# Patient Record
Sex: MALE | URBAN - METROPOLITAN AREA
[De-identification: names, ages, dates, MRNs, and addresses within clinical notes are randomized per-mention and may not be internally consistent; named-entity substitution may affect disease eponyms.]

---

## 2018-05-10 ENCOUNTER — OFFICE VISIT (OUTPATIENT)
Dept: INTERNAL MEDICINE CLINIC | Facility: CLINIC | Age: 33
End: 2018-05-10

## 2018-05-10 VITALS
HEART RATE: 75 BPM | TEMPERATURE: 98.7 F | DIASTOLIC BLOOD PRESSURE: 94 MMHG | BODY MASS INDEX: 25.62 KG/M2 | RESPIRATION RATE: 18 BRPM | HEIGHT: 69 IN | WEIGHT: 173 LBS | SYSTOLIC BLOOD PRESSURE: 140 MMHG

## 2018-05-10 DIAGNOSIS — R03.0 ELEVATED BLOOD PRESSURE READING: ICD-10-CM

## 2018-05-10 DIAGNOSIS — E11.9 TYPE 2 DIABETES MELLITUS WITHOUT COMPLICATION, WITHOUT LONG-TERM CURRENT USE OF INSULIN (HCC): Primary | ICD-10-CM

## 2018-05-10 DIAGNOSIS — Z23 ENCOUNTER FOR IMMUNIZATION: ICD-10-CM

## 2018-05-10 DIAGNOSIS — N52.9 ERECTILE DYSFUNCTION, UNSPECIFIED ERECTILE DYSFUNCTION TYPE: ICD-10-CM

## 2018-05-10 LAB — HBA1C MFR BLD HPLC: 7.6 %

## 2018-05-10 RX ORDER — METFORMIN HYDROCHLORIDE 500 MG/1
1000 TABLET, EXTENDED RELEASE ORAL
Qty: 60 TAB | Refills: 3 | Status: SHIPPED | OUTPATIENT
Start: 2018-05-10 | End: 2018-09-07 | Stop reason: SDUPTHER

## 2018-05-10 NOTE — PROGRESS NOTES
Subjective:      Derrell Speaker is a 29 y.o. male who presents today for   Chief Complaint   Patient presents with    Diabetes   patient in today for establishment  Patient last saw a doctor in [place] about 3 months ago  Patient diagnosed at age 34 with type 2 diabetes  Mother and father both have diabetes  He says he has been taking metformin  mg daily which was prescribed in [facility]  He says his last A1c was 6.3  A1c today is 7.6    Patient has noticed erectile dysfunction for one year    Flu advised in the fall  Pneumovax today  Order microalbumin today  He has not seen podiatry or ophthalmology      There are no active problems to display for this patient. No Known Allergies  Past Medical History:   Diagnosis Date    Diabetes ([address]      Past Surgical History:   Procedure Laterality Date    HX TONSILLECTOMY       Family History   Problem Relation Age of Onset    Diabetes Mother     Diabetes Father      Social History   Substance Use Topics    Smoking status: Never Smoker    Smokeless tobacco: Never Used    Alcohol use No      Software, , no children  Review of Systems    A comprehensive review of systems was negative except for that written in the HPI. Objective:     Visit Vitals    BP (!) 140/94    Pulse 75    Temp 98.7 °F (37.1 °C) (Oral)    Resp 18    Ht 5' 9\" (1.753 m)    Wt 173 lb (78.5 kg)    BMI 25.55 kg/m2     General:  Alert, cooperative, no distress, appears stated age. Head:  Normocephalic, without obvious abnormality, atraumatic. Eyes:  Conjunctivae/corneas clear. PERRL, EOMs intact. Fundi benign. Ears:  Normal TMs and external ear canals both ears. Nose: Nares normal. Septum midline. Mucosa normal. No drainage or sinus tenderness. Throat: Lips, mucosa, and tongue normal. Teeth and gums normal.   Neck: Supple, symmetrical, trachea midline, no adenopathy, thyroid: no enlargement/tenderness/nodules, no carotid bruit and no JVD.    Back: Symmetric, no curvature. ROM normal. No CVA tenderness. Lungs:   Clear to auscultation bilaterally. Chest wall:  No tenderness or deformity. Heart:  Regular rate and rhythm, S1, S2 normal, no murmur, click, rub or gallop. Abdomen:   Soft, non-tender. Bowel sounds normal. No masses,  No organomegaly. Extremities: Extremities normal, atraumatic, no cyanosis or edema. Pulses: 2+ and symmetric all extremities. Skin: Skin color, texture, turgor normal. No rashes or lesions. Lymph nodes: Cervical, supraclavicular, and axillary nodes normal.   Neurologic: CNII-XII intact. Normal strength, sensation and reflexes throughout. Assessment/Plan:       ICD-10-CM ICD-9-CM    1. Type 2 diabetes mellitus without complication, without long-term current use of insulin ([facility]) J79.3 291.95 METABOLIC PANEL, COMPREHENSIVE      LIPID PANEL      CBC WITH AUTOMATED DIFF      MICROALBUMIN, UR, RAND W/ MICROALB/CREAT RATIO      AMB POC HEMOGLOBIN A1C      TESTOSTERONE, FREE & TOTAL      REFERRAL TO OPHTHALMOLOGY      REFERRAL TO PODIATRY    Increase metformin xr from 500 mg to 1000 mg daily. Patient was offered diabetic teaching but does not want to attend   2. Elevated blood pressure reading R03.0 796.2    3. Erectile dysfunction, unspecified erectile dysfunction type N52.9 607.84 TESTOSTERONE, FREE & TOTAL   4. Encounter for immunization Z23 V03.89 PNEUMOCOCCAL POLYSACCHARIDE VACCINE, 23-VALENT, ADULT OR IMMUNOSUPPRESSED PT DOSE,       Follow-up Disposition: Not on File   Advised him to call back or return to office if symptoms worsen/change/persist.  Discussed expected course/resolution/complications of diagnosis in detail with patient. Medication risks/benefits/costs/interactions/alternatives discussed with patient. He was given an after visit summary which includes diagnoses, current medications, & vitals. He expressed understanding with the diagnosis and plan.

## 2018-05-10 NOTE — PROGRESS NOTES
Chief Complaint   Patient presents with    Diabetes     1. Have you been to the ER, urgent care clinic since your last visit? Hospitalized since your last visit? No    2. Have you seen or consulted any other health care providers outside of the [address] [place] [address] since your last visit? Include any pap smears or colon screening. Nena Willoughby  is a 29 y.o.  male  who present for pnuemovax 23 immunizations/injections. He/she denies any symptoms , reactions or allergies that would exclude them from being immunized today. Risks and adverse reactions were discussed and the VIS was given if applicable to them. All questions were addressed. He/She was observed for 5 min post injection. There were no reactions observed.     Coy Cespedes LPN

## 2018-05-10 NOTE — LETTER
5/16/2018 12:13 PM 
 
Mr. Meghan Srinivasan [address] [facility],[address] [facility] [address] Dear Meghan Srinivasan: 
 
Please find your most recent results below. Resulted Orders METABOLIC PANEL, COMPREHENSIVE Result Value Ref Range Glucose 153 (H) 65 - 99 mg/dL BUN 11 6 - 20 mg/dL Creatinine 0.82 0.76 - 1.27 mg/dL GFR est non- >59 mL/min/1.73 GFR est  >59 mL/min/1.73  
 BUN/Creatinine ratio 13 9 - 20 Sodium 141 134 - 144 mmol/L Potassium 4.5 3.5 - 5.2 mmol/L Chloride 101 96 - 106 mmol/L  
 CO2 24 18 - 29 mmol/L Calcium 9.5 8.7 - 10.2 mg/dL Protein, total 7.0 6.0 - 8.5 g/dL Albumin 4.6 3.5 - 5.5 g/dL GLOBULIN, TOTAL 2.4 1.5 - 4.5 g/dL A-G Ratio 1.9 1.2 - 2.2 Bilirubin, total 0.4 0.0 - 1.2 mg/dL Alk. phosphatase 72 39 - 117 IU/L  
 AST (SGOT) 20 0 - 40 IU/L  
 ALT (SGPT) 35 0 - 44 IU/L Narrative Performed at:  [address]  [id number] : Frankie Valero MD, Phone:  [phone] LIPID PANEL Result Value Ref Range Cholesterol, total 133 100 - 199 mg/dL Triglyceride 84 0 - 149 mg/dL HDL Cholesterol 37 (L) >39 mg/dL VLDL, calculated 17 5 - 40 mg/dL LDL, calculated 79 0 - 99 mg/dL Narrative Performed at:  [address]  [id number] : Frankie Valero MD, Phone:  [phone] CBC WITH AUTOMATED DIFF Result Value Ref Range WBC 5.0 3.4 - 10.8 x10E3/uL  
 RBC 5.45 4.14 - 5.80 x10E6/uL HGB 15.4 13.0 - 17.7 g/dL HCT 46.6 37.5 - 51.0 % MCV 86 79 - 97 fL  
 MCH 28.3 26.6 - 33.0 pg  
 MCHC 33.0 31.5 - 35.7 g/dL  
 RDW 13.8 12.3 - 15.4 % PLATELET 132 272 - 908 x10E3/uL NEUTROPHILS 53 Not Estab. % Lymphocytes 39 Not Estab. % MONOCYTES 5 Not Estab. % EOSINOPHILS 2 Not Estab. % BASOPHILS 1 Not Estab. %  
 ABS. NEUTROPHILS 2.7 1.4 - 7.0 x10E3/uL Abs Lymphocytes 1.9 0.7 - 3.1 x10E3/uL  
 ABS. MONOCYTES 0.3 0.1 - 0.9 x10E3/uL  
 ABS. EOSINOPHILS 0.1 0.0 - 0.4 x10E3/uL  
 ABS. BASOPHILS 0.0 0.0 - 0.2 x10E3/uL IMMATURE GRANULOCYTES 0 Not Estab. %  
 ABS. IMM. GRANS. 0.0 0.0 - 0.1 x10E3/uL Narrative Performed at:  [address]  [id number] : Frankie Valero MD, Phone:  [phone] MICROALBUMIN, UR, RAND W/ MICROALB/CREAT RATIO Result Value Ref Range Creatinine, urine 68.7 Not Estab. mg/dL Microalbumin, urine 5.0 Not Estab. ug/mL Microalb/Creat ratio (ug/mg creat.) 7.3 0.0 - 30.0 mg/g creat Narrative Performed at:  [address]  [id number] : Frankie Valero MD, Phone:  [phone] AMB POC HEMOGLOBIN A1C Result Value Ref Range Hemoglobin A1c (POC) 7.6 % TESTOSTERONE, FREE & TOTAL Result Value Ref Range Testosterone 352 264 - 916 ng/dL Comment:  
   Adult male reference interval is based on a population of 
healthy nonobese males (BMI <30) between 23and 44years old. [address] [place] [address], [address] [address],[facility];4236-3191. PMID: [id number]. Free testosterone (Direct) 13.0 8.7 - 25.1 pg/mL Narrative Performed at:  [address]  [id number] : Frankie Valero MD, Phone:  [phone] RECOMMENDATIONS: 
{RECOMMENDATION QDWK:46911: 
:Blood sugar is elevated which is consistent with your diabetes Normal kidney and liver function Normal urine microalbumin Normal blood counts Normal testosterone Overall lipid panel looks fine HDL slightly low- should improve with exercise, also eat foods high in omega 3 fatty acids like salmon, almonds, avocado Please call me if you have any questions: [phone] Sincerely, Jess Morgan MD

## 2018-05-10 NOTE — MR AVS SNAPSHOT
Lisethreysammy Fareed 
 
 
 [facility] 
[phone] Patient: Meghan Srinivasan MRN: [id number] ARMIN:[birth year] Visit Information Date & Time Provider Department Dept. Phone Encounter #  
 5/10/2018  1:00 PM Jess Morgan [facility] Internal Medicine [phone] [id number] Follow-up Instructions Return in about 2 weeks (around 5/24/2018) for 2 week s bp check, 3 months diabetes check. Your Appointments 5/30/2018 12:15 PM  
Office Visit with Jess Morgan MD  
[facility] Internal Medicine [facility] [address] Upcoming Health Maintenance Date Due DTaP/Tdap/Td series (1 - Tdap) 8/14/2008 Influenza Age 5 to Adult 8/1/2018 Allergies as of 5/10/2018  Review Complete On: 5/10/2018 By: Mark Conti LPN No Known Allergies Current Immunizations  Never Reviewed Name Date Pneumococcal Polysaccharide (PPSV-23)  Incomplete Not reviewed this visit You Were Diagnosed With   
  
 Codes Comments Type 2 diabetes mellitus without complication, without long-term current use of insulin (HCC)    -  Primary ICD-10-CM: E11.9 ICD-9-CM: 250.00 Elevated blood pressure reading     ICD-10-CM: R03.0 ICD-9-CM: 796.2 Erectile dysfunction, unspecified erectile dysfunction type     ICD-10-CM: N52.9 ICD-9-CM: 607.84 Encounter for immunization     ICD-10-CM: R27 ICD-9-CM: V03.89 Vitals BP Pulse Temp Resp Height(growth percentile) Weight(growth percentile) (!) 140/94 75 98.7 °F (37.1 °C) (Oral) 18 5' 9\" (1.753 m) 173 lb (78.5 kg) BMI Smoking Status 25.55 kg/m2 Never Smoker Vitals History BMI and BSA Data Body Mass Index Body Surface Area 25.55 kg/m 2 1.95 m 2 Your Updated Medication List  
  
   
 This list is accurate as of 5/10/18  2:04 PM.  Always use your most recent med list.  
  
  
  
  
 metFORMIN  mg tablet Commonly known as:  GLUCOPHAGE XR Take 2 Tabs by mouth daily (with dinner). Prescriptions Printed Refills  
 metFORMIN ER (GLUCOPHAGE XR) 500 mg tablet 3 Sig: Take 2 Tabs by mouth daily (with dinner). Class: Print Route: Oral  
  
We Performed the Following AMB POC HEMOGLOBIN A1C [17112 CPT(R)] CBC WITH AUTOMATED DIFF [89202 CPT(R)] LIPID PANEL [43257 CPT(R)] METABOLIC PANEL, COMPREHENSIVE [51391 CPT(R)] MICROALBUMIN, UR, RAND W/ MICROALB/CREAT RATIO [id number] CPT(R)] PNEUMOCOCCAL POLYSACCHARIDE VACCINE, 23-VALENT, ADULT OR IMMUNOSUPPRESSED PT DOSE, [29190 CPT(R)] REFERRAL TO OPHTHALMOLOGY [REF57 Custom] REFERRAL TO PODIATRY [REF90 Custom] Comments:  
 LILI UgartePOLY [address] Phone: [phone] TESTOSTERONE, FREE & TOTAL [41384 CPT(R)] Follow-up Instructions Return in about 2 weeks (around 5/24/2018) for 2 week s bp check, 3 months diabetes check. Referral Information Referral ID Referred By Referred To  
  
 [id number] LATHAM, Harrel Blizzard, [address] [place] [address] [phone] Phone: [phone] Fax: [phone] Visits Status Start Date End Date 1 New Request 5/10/18 5/10/19 If your referral has a status of pending review or denied, additional information will be sent to support the outcome of this decision. Referral ID Referred By Referred To  
 [id number] Katy Carlson DPM  
   [address] [facility] [address] [facility] [address] [facility] [address] Phone: [phone] Fax: [phone] Visits Status Start Date End Date 1 New Request 5/10/18 5/10/19  If your referral has a status of pending review or denied, additional information will be sent to support the outcome of this decision. Introducing [facility] & HEALTH SERVICES! [employer] introduces [employer] patient portal. Now you can access parts of your medical record, email your doctor's office, and request medication refills online. 1. In your internet browser, go to [email or web address] 2. Click on the First Time User? Click Here link in the Sign In box. You will see the New Member Sign Up page. 3. Enter your [employer] Access Code exactly as it appears below. You will not need to use this code after youve completed the sign-up process. If you do not sign up before the expiration date, you must request a new code. · [employer] Access Code: [email or web address] Expires: 8/8/2018  1:09 PM 
 
4. Enter the last four digits of your Social Security Number (xxxx) and Date of Birth (mm/dd/yyyy) as indicated and click Submit. You will be taken to the next sign-up page. 5. Create a [employer] ID. This will be your [employer] login ID and cannot be changed, so think of one that is secure and easy to remember. 6. Create a [employer] password. You can change your password at any time. 7. Enter your Password Reset Question and Answer. This can be used at a later time if you forget your password. 8. Enter your e-mail address. You will receive e-mail notification when new information is available in [address] 9. Click Sign Up. You can now view and download portions of your medical record. 10. Click the Download Summary menu link to download a portable copy of your medical information. If you have questions, please visit the Frequently Asked Questions section of the [employer] website. Remember, [employer] is NOT to be used for urgent needs. For medical emergencies, dial 911. Now available from your iPhone and Android! Please provide this summary of care documentation to your next provider. If you have any questions after today's visit, please call [phone].

## 2018-05-11 ENCOUNTER — LAB ONLY (OUTPATIENT)
Dept: INTERNAL MEDICINE CLINIC | Facility: CLINIC | Age: 33
End: 2018-05-11

## 2018-05-13 LAB
ALBUMIN SERPL-MCNC: 4.6 G/DL (ref 3.5–5.5)
ALBUMIN/CREAT UR: 7.3 MG/G CREAT (ref 0–30)
ALBUMIN/GLOB SERPL: 1.9 {RATIO} (ref 1.2–2.2)
ALP SERPL-CCNC: 72 IU/L (ref 39–117)
ALT SERPL-CCNC: 35 IU/L (ref 0–44)
AST SERPL-CCNC: 20 IU/L (ref 0–40)
BASOPHILS # BLD AUTO: 0 X10E3/UL (ref 0–0.2)
BASOPHILS NFR BLD AUTO: 1 %
BILIRUB SERPL-MCNC: 0.4 MG/DL (ref 0–1.2)
BUN SERPL-MCNC: 11 MG/DL (ref 6–20)
BUN/CREAT SERPL: 13 (ref 9–20)
CALCIUM SERPL-MCNC: 9.5 MG/DL (ref 8.7–10.2)
CHLORIDE SERPL-SCNC: 101 MMOL/L (ref 96–106)
CHOLEST SERPL-MCNC: 133 MG/DL (ref 100–199)
CO2 SERPL-SCNC: 24 MMOL/L (ref 18–29)
CREAT SERPL-MCNC: 0.82 MG/DL (ref 0.76–1.27)
CREAT UR-MCNC: 68.7 MG/DL
EOSINOPHIL # BLD AUTO: 0.1 X10E3/UL (ref 0–0.4)
EOSINOPHIL NFR BLD AUTO: 2 %
ERYTHROCYTE [DISTWIDTH] IN BLOOD BY AUTOMATED COUNT: 13.8 % (ref 12.3–15.4)
GFR SERPLBLD CREATININE-BSD FMLA CKD-EPI: 119 ML/MIN/1.73
GFR SERPLBLD CREATININE-BSD FMLA CKD-EPI: 137 ML/MIN/1.73
GLOBULIN SER CALC-MCNC: 2.4 G/DL (ref 1.5–4.5)
GLUCOSE SERPL-MCNC: 153 MG/DL (ref 65–99)
HCT VFR BLD AUTO: 46.6 % (ref 37.5–51)
HDLC SERPL-MCNC: 37 MG/DL
HGB BLD-MCNC: 15.4 G/DL (ref 13–17.7)
IMM GRANULOCYTES # BLD: 0 X10E3/UL (ref 0–0.1)
IMM GRANULOCYTES NFR BLD: 0 %
LDLC SERPL CALC-MCNC: 79 MG/DL (ref 0–99)
LYMPHOCYTES # BLD AUTO: 1.9 X10E3/UL (ref 0.7–3.1)
LYMPHOCYTES NFR BLD AUTO: 39 %
MCH RBC QN AUTO: 28.3 PG (ref 26.6–33)
MCHC RBC AUTO-ENTMCNC: 33 G/DL (ref 31.5–35.7)
MCV RBC AUTO: 86 FL (ref 79–97)
MICROALBUMIN UR-MCNC: 5 UG/ML
MONOCYTES # BLD AUTO: 0.3 X10E3/UL (ref 0.1–0.9)
MONOCYTES NFR BLD AUTO: 5 %
NEUTROPHILS # BLD AUTO: 2.7 X10E3/UL (ref 1.4–7)
NEUTROPHILS NFR BLD AUTO: 53 %
PLATELET # BLD AUTO: 166 X10E3/UL (ref 150–379)
POTASSIUM SERPL-SCNC: 4.5 MMOL/L (ref 3.5–5.2)
PROT SERPL-MCNC: 7 G/DL (ref 6–8.5)
RBC # BLD AUTO: 5.45 X10E6/UL (ref 4.14–5.8)
SODIUM SERPL-SCNC: 141 MMOL/L (ref 134–144)
TESTOST FREE SERPL-MCNC: 13 PG/ML (ref 8.7–25.1)
TESTOST SERPL-MCNC: 352 NG/DL (ref 264–916)
TRIGL SERPL-MCNC: 84 MG/DL (ref 0–149)
VLDLC SERPL CALC-MCNC: 17 MG/DL (ref 5–40)
WBC # BLD AUTO: 5 X10E3/UL (ref 3.4–10.8)

## 2018-05-16 NOTE — PROGRESS NOTES
Blood sugar is elevated which is consistent with his diabetes  Normal kidney and liver function  Normal urine microalbumin  Normal blood counts  Normal testosterone  Overall lipid panel looks fine  HDL slightly low- should improve with exercise, also eat foods high in omega 3 fatty acids like salmon, almonds, avocado

## 2018-05-30 ENCOUNTER — OFFICE VISIT (OUTPATIENT)
Dept: INTERNAL MEDICINE CLINIC | Facility: CLINIC | Age: 33
End: 2018-05-30

## 2018-05-30 VITALS
WEIGHT: 173.6 LBS | DIASTOLIC BLOOD PRESSURE: 75 MMHG | HEART RATE: 76 BPM | RESPIRATION RATE: 18 BRPM | TEMPERATURE: 98.3 F | BODY MASS INDEX: 25.71 KG/M2 | SYSTOLIC BLOOD PRESSURE: 114 MMHG | HEIGHT: 69 IN

## 2018-05-30 DIAGNOSIS — E78.6 LOW HDL (UNDER 40): ICD-10-CM

## 2018-05-30 DIAGNOSIS — E11.9 TYPE 2 DIABETES MELLITUS WITHOUT COMPLICATION, WITHOUT LONG-TERM CURRENT USE OF INSULIN (HCC): ICD-10-CM

## 2018-05-30 DIAGNOSIS — R03.0 ELEVATED BP WITHOUT DIAGNOSIS OF HYPERTENSION: Primary | ICD-10-CM

## 2018-05-30 NOTE — MR AVS SNAPSHOT
[address] [facility] 
[phone] Patient: Scot Anguiano MRN: [id number] LEL:[birth year] Visit Information Date & Time Provider Department Dept. Phone Encounter #  
 5/30/2018 12:15 PM Patricia Brandt, [address] [facility] Internal Medicine [phone] [id number] Follow-up Instructions Return in about 2 months (around 8/10/2018) for A1c and diabetic check up. Your Appointments 7/2/2018  2:15 PM  
ROUTINE CARE with Patricia Brandt MD  
[facility] Internal Medicine [address] [facility]) Appt Note: 45 days later diabetic check [address] [facility] Upcoming Health Maintenance Date Due DTaP/Tdap/Td series (1 - Tdap) 8/14/2008 Influenza Age 5 to Adult 8/1/2018 Allergies as of 5/30/2018  Review Complete On: 5/30/2018 By: Danielle Cortez LPN No Known Allergies Current Immunizations  Reviewed on 5/10/2018 Name Date Pneumococcal Polysaccharide (PPSV-23) 5/10/2018 Not reviewed this visit Vitals BP Pulse Temp Resp Height(growth percentile) Weight(growth percentile) 114/75 76 98.3 °F (36.8 °C) (Oral) 18 5' 9\" (1.753 m) 173 lb 9.6 oz (78.7 kg) BMI Smoking Status 25.64 kg/m2 Never Smoker Vitals History BMI and BSA Data Body Mass Index Body Surface Area  
 25.64 kg/m 2 1.96 m 2 Preferred Pharmacy Pharmacy Name Phone [id number] [place] [place] [address] Your Updated Medication List  
  
   
This list is accurate as of 5/30/18 12:55 PM.  Always use your most recent med list.  
  
  
  
  
 metFORMIN  mg tablet Commonly known as:  GLUCOPHAGE XR Take 2 Tabs by mouth daily (with dinner). Follow-up Instructions Return in about 2 months (around 8/10/2018) for A1c and diabetic check up. Introducing [facility] & HEALTH SERVICES! [employer] introduces [employer] patient portal. Now you can access parts of your medical record, email your doctor's office, and request medication refills online. 1. In your internet browser, go to [email or web address] 2. Click on the First Time User? Click Here link in the Sign In box. You will see the New Member Sign Up page. 3. Enter your [employer] Access Code exactly as it appears below. You will not need to use this code after youve completed the sign-up process. If you do not sign up before the expiration date, you must request a new code. · [employer] Access Code: [email or web address] Expires: 8/8/2018  1:09 PM 
 
4. Enter the last four digits of your Social Security Number (xxxx) and Date of Birth (mm/dd/yyyy) as indicated and click Submit. You will be taken to the next sign-up page. 5. Create a [employer] ID. This will be your [employer] login ID and cannot be changed, so think of one that is secure and easy to remember. 6. Create a [employer] password. You can change your password at any time. 7. Enter your Password Reset Question and Answer. This can be used at a later time if you forget your password. 8. Enter your e-mail address. You will receive e-mail notification when new information is available in [address] 9. Click Sign Up. You can now view and download portions of your medical record. 10. Click the Download Summary menu link to download a portable copy of your medical information. If you have questions, please visit the Frequently Asked Questions section of the [employer] website. Remember, [employer] is NOT to be used for urgent needs. For medical emergencies, dial 911. Now available from your iPhone and Android! Please provide this summary of care documentation to your next provider. Your primary care clinician is listed as Deryl Drivers. If you have any questions after today's visit, please call [phone].

## 2018-05-30 NOTE — PROGRESS NOTES
Subjective:      Annabel Martinez is a 29 y.o. male who presents today for   Chief Complaint   Patient presents with    Blood Pressure Check     Elevated blood pressure- BP elevated at las visit and in today for recheck    Type 2 DM- has been taking increased dose of metformin XR. Increased to 2 tabs of metformin  mg daily    Low HDL per recent lipid panel    There are no active problems to display for this patient. Current Outpatient Prescriptions   Medication Sig Dispense Refill    metFORMIN ER (GLUCOPHAGE XR) 500 mg tablet Take 2 Tabs by mouth daily (with dinner). 61 Tab 3     No Known Allergies  Past Medical History:   Diagnosis Date    Diabetes ([address]      Past Surgical History:   Procedure Laterality Date    HX TONSILLECTOMY       Family History   Problem Relation Age of Onset    Diabetes Mother     Diabetes Father      Social History   Substance Use Topics    Smoking status: Never Smoker    Smokeless tobacco: Never Used    Alcohol use No        Review of Systems    A comprehensive review of systems was negative except for that written in the HPI. Objective:     Visit Vitals    /75    Pulse 76    Temp 98.3 °F (36.8 °C) (Oral)    Resp 18    Ht 5' 9\" (1.753 m)    Wt 173 lb 9.6 oz (78.7 kg)    BMI 25.64 kg/m2     General:  Alert, cooperative, no distress, appears stated age. Head:  Normocephalic, without obvious abnormality, atraumatic. Eyes:  Conjunctivae/corneas clear. PERRL, EOMs intact. Fundi benign. Ears:  Normal TMs and external ear canals both ears. Nose: Nares normal. Septum midline. Mucosa normal. No drainage or sinus tenderness. Throat: Lips, mucosa, and tongue normal. Teeth and gums normal.   Neck: Supple, symmetrical, trachea midline, no adenopathy, thyroid: no enlargement/tenderness/nodules, no carotid bruit and no JVD. Back:   Symmetric, no curvature. ROM normal. No CVA tenderness. Lungs:   Clear to auscultation bilaterally.    Chest wall:  No tenderness or deformity. Heart:  Regular rate and rhythm, S1, S2 normal, no murmur, click, rub or gallop. Abdomen:   Soft, non-tender. Bowel sounds normal. No masses,  No organomegaly. Extremities: Extremities normal, atraumatic, no cyanosis or edema. Pulses: 2+ and symmetric all extremities. Skin: Skin color, texture, turgor normal. No rashes or lesions. Lymph nodes: Cervical, supraclavicular, and axillary nodes normal.   Neurologic: CNII-XII intact. Normal strength, sensation and reflexes throughout. Assessment/Plan:     1) elevated BP- BP much better controlled today    2) low HDL- discussed regular exercise and eating foods high in omega 3 fatty acids    3)type 2 DM- continue 2 metformin daily, low carb diet, exercise    Follow-up Disposition: Not on File   Advised him to call back or return to office if symptoms worsen/change/persist.  Discussed expected course/resolution/complications of diagnosis in detail with patient. Medication risks/benefits/costs/interactions/alternatives discussed with patient. He was given an after visit summary which includes diagnoses, current medications, & vitals. He expressed understanding with the diagnosis and plan.  v

## 2018-05-30 NOTE — PROGRESS NOTES
Chief Complaint   Patient presents with    Blood Pressure Check     1. Have you been to the ER, urgent care clinic since your last visit? Hospitalized since your last visit? No    2. Have you seen or consulted any other health care providers outside of the [facility] since your last visit? Include any pap smears or colon screening.  No

## 2018-08-07 ENCOUNTER — TELEPHONE (OUTPATIENT)
Dept: INTERNAL MEDICINE CLINIC | Facility: CLINIC | Age: 33
End: 2018-08-07

## 2018-08-07 ENCOUNTER — OFFICE VISIT (OUTPATIENT)
Dept: INTERNAL MEDICINE CLINIC | Facility: CLINIC | Age: 33
End: 2018-08-07

## 2018-08-07 VITALS
HEART RATE: 71 BPM | TEMPERATURE: 98.5 F | HEIGHT: 69 IN | RESPIRATION RATE: 18 BRPM | SYSTOLIC BLOOD PRESSURE: 129 MMHG | BODY MASS INDEX: 25.33 KG/M2 | WEIGHT: 171 LBS | OXYGEN SATURATION: 96 % | DIASTOLIC BLOOD PRESSURE: 82 MMHG

## 2018-08-07 DIAGNOSIS — R53.83 FATIGUE, UNSPECIFIED TYPE: Primary | ICD-10-CM

## 2018-08-07 DIAGNOSIS — R03.0 ELEVATED BLOOD PRESSURE READING: ICD-10-CM

## 2018-08-07 DIAGNOSIS — R07.9 CHEST PAIN, UNSPECIFIED TYPE: ICD-10-CM

## 2018-08-07 DIAGNOSIS — E78.6 LOW HDL (UNDER 40): ICD-10-CM

## 2018-08-07 LAB — HBA1C MFR BLD HPLC: 9.7 %

## 2018-08-07 NOTE — MR AVS SNAPSHOT
[address] [facility] 
[phone] Patient: Minnie Fulton MRN: [id number] MLA:8/98/2970 Visit Information Date & Time Provider Department Dept. Phone Encounter #  
 8/7/2018 10:15 AM Rubye Harada, MD [facility] Internal Medicine [phone] [id number] Follow-up Instructions Return in about 2 weeks (around 8/21/2018) for follow up review results. Your Appointments 8/10/2018 10:00 AM  
ROUTINE CARE with Rubye Harada, MD  
[facility] Internal Medicine [facility]) Appt Note: A1c and diabetic check up $0CP 05/30/2018 [facility] Upcoming Health Maintenance Date Due  
 FOOT EXAM Q1 8/14/1997 EYE EXAM RETINAL OR DILATED Q1 8/14/1997 DTaP/Tdap/Td series (1 - Tdap) 8/14/2008 Influenza Age 5 to Adult 8/1/2018 HEMOGLOBIN A1C Q6M 11/10/2018 MICROALBUMIN Q1 5/11/2019 LIPID PANEL Q1 5/11/2019 Allergies as of 8/7/2018  Review Complete On: 8/7/2018 By: Pedro Curran LPN No Known Allergies Current Immunizations  Reviewed on 5/10/2018 Name Date Pneumococcal Polysaccharide (PPSV-23) 5/10/2018 Not reviewed this visit You Were Diagnosed With   
  
 Codes Comments Fatigue, unspecified type    -  Primary ICD-10-CM: R53.83 ICD-9-CM: 780.79 Chest pain, unspecified type     ICD-10-CM: R07.9 ICD-9-CM: 786.50 Controlled type 2 diabetes mellitus without complication, without long-term current use of insulin ([facility]     ICD-10-CM: E11.9 ICD-9-CM: 250.00 Low HDL (under 40)     ICD-10-CM: E78.6 ICD-9-CM: 272.5 Elevated blood pressure reading     ICD-10-CM: R03.0 ICD-9-CM: 796.2  Uncontrolled type 2 diabetes mellitus with complication, without long-term current use of insulin ([facility])     ICD-10-CM: E11.8, E11.65 
 ICD-9-CM: 250.82 Vitals BP Pulse Temp Resp Height(growth percentile) Weight(growth percentile) 129/82 (BP 1 Location: Right arm, BP Patient Position: Sitting) 71 98.5 °F (36.9 °C) (Oral) 18 5' 9\" (1.753 m) 171 lb (77.6 kg) SpO2 BMI Smoking Status 96% 25.25 kg/m2 Never Smoker Vitals History BMI and BSA Data Body Mass Index Body Surface Area  
 25.25 kg/m 2 1.94 m 2 Preferred Pharmacy Pharmacy Name Phone [id number] [place], [place] [address] Your Updated Medication List  
  
   
This list is accurate as of 8/7/18 11:33 AM.  Always use your most recent med list.  
  
  
  
  
 metFORMIN  mg tablet Commonly known as:  GLUCOPHAGE XR Take 2 Tabs by mouth daily (with dinner). SITagliptin 100 mg tablet Commonly known as:  [place] Take 1 Tab by mouth daily. Prescriptions Sent to Pharmacy Refills SITagliptin (JANUVIA) 100 mg tablet 6 Sig: Take 1 Tab by mouth daily. Class: Normal  
 Pharmacy: [facility]/pharmacy #[id number] [place], [address] [place] [address] Ph #: [phone] Route: Oral  
  
We Performed the Following AMB POC EKG ROUTINE W/ 12 LEADS, INTER & REP [75527 CPT(R)] AMB POC HEMOGLOBIN A1C [64054 CPT(R)] CBC WITH AUTOMATED DIFF [09133 CPT(R)] METABOLIC PANEL, COMPREHENSIVE [29703 CPT(R)] REFERRAL TO CARDIOLOGY [HMT35 Custom] REFERRAL TO ENDOCRINOLOGY [WYD05 Custom] TSH 3RD GENERATION [92820 CPT(R)] VITAMIN B12 U0108747 CPT(R)] VITAMIN D, 25 HYDROXY Q479269 CPT(R)] Follow-up Instructions Return in about 2 weeks (around 8/21/2018) for follow up review results. Referral Information Referral ID Referred By Referred To  
  
 [id number] Amanda Richards [address] Phone: [phone] Fax: [phone] Visits Status Start Date End Date 1 New Request 8/7/18 8/7/19 If your referral has a status of pending review or denied, additional information will be sent to support the outcome of this decision. Referral ID Referred By Referred To  
 [id number] Judi SILVA [place] [address] [address] [address] Phone: [phone] Fax: [phone] Visits Status Start Date End Date 1 New Request 8/7/18 8/7/19 If your referral has a status of pending review or denied, additional information will be sent to support the outcome of this decision. Introducing [facility] & [facility]! Val Lares introduces [employer] patient portal. Now you can access parts of your medical record, email your doctor's office, and request medication refills online. 1. In your internet browser, go to [email or web address] 2. Click on the First Time User? Click Here link in the Sign In box. You will see the New Member Sign Up page. 3. Enter your [employer]a Access Code exactly as it appears below. You will not need to use this code after youve completed the sign-up process. If you do not sign up before the expiration date, you must request a new code. · [employer] Access Code: [email or web address] Expires: 8/8/2018  1:09 PM 
 
4. Enter the last four digits of your Social Security Number (xxxx) and Date of Birth (mm/dd/yyyy) as indicated and click Submit. You will be taken to the next sign-up page. 5. Create a [employer] ID. This will be your Ben[employer]a login ID and cannot be changed, so think of one that is secure and easy to remember. 6. Create a [employer]a password. You can change your password at any time. 7. Enter your Password Reset Question and Answer. This can be used at a later time if you forget your password. 8. Enter your e-mail address. You will receive e-mail notification when new information is available in [address] 9. Click Sign Up.  You can now view and download portions of your medical record. 10. Click the Download Summary menu link to download a portable copy of your medical information. If you have questions, please visit the Frequently Asked Questions section of the Me[employer] website. Remember, [employer] is NOT to be used for urgent needs. For medical emergencies, dial 911. Now available from your iPhone and Android! Please provide this summary of care documentation to your next provider. Your primary care clinician is listed as Dyan Sarmiento. If you have any questions after today's visit, please call [phone].

## 2018-08-07 NOTE — TELEPHONE ENCOUNTER
----- Message from Renée Medicmiki sent at 8/7/2018  2:41 PM EDT -----  Regarding: Dr. Karma Snellen Leva Ramal  Pt advised that he was prescribed a Rx however his insurance does not cover it. Pt advised that [facility] recommended a different medication that insurance would cover and patient wants to confirm with doctor that he can take it.  Pt advised that [facility] has faxed over the Rx request.   Best contact: [phone]

## 2018-08-07 NOTE — PROGRESS NOTES
Room 1    Chief Complaint   Patient presents with    Diabetes     Follow up     1. Have you been to the ER, urgent care clinic since your last visit? Hospitalized since your last visit? No    2. Have you seen or consulted any other health care providers outside of the [address] [place] [address] since your last visit? Include any pap smears or colon screening.  No

## 2018-08-08 LAB
25(OH)D3+25(OH)D2 SERPL-MCNC: 21.8 NG/ML (ref 30–100)
ALBUMIN SERPL-MCNC: 5 G/DL (ref 3.5–5.5)
ALBUMIN/GLOB SERPL: 2.4 {RATIO} (ref 1.2–2.2)
ALP SERPL-CCNC: 92 IU/L (ref 39–117)
ALT SERPL-CCNC: 31 IU/L (ref 0–44)
AST SERPL-CCNC: 19 IU/L (ref 0–40)
BASOPHILS # BLD AUTO: 0 X10E3/UL (ref 0–0.2)
BASOPHILS NFR BLD AUTO: 0 %
BILIRUB SERPL-MCNC: 0.5 MG/DL (ref 0–1.2)
BUN SERPL-MCNC: 11 MG/DL (ref 6–20)
BUN/CREAT SERPL: 16 (ref 9–20)
CALCIUM SERPL-MCNC: 9.6 MG/DL (ref 8.7–10.2)
CHLORIDE SERPL-SCNC: 100 MMOL/L (ref 96–106)
CO2 SERPL-SCNC: 19 MMOL/L (ref 20–29)
CREAT SERPL-MCNC: 0.68 MG/DL (ref 0.76–1.27)
EOSINOPHIL # BLD AUTO: 0.1 X10E3/UL (ref 0–0.4)
EOSINOPHIL NFR BLD AUTO: 1 %
ERYTHROCYTE [DISTWIDTH] IN BLOOD BY AUTOMATED COUNT: 14 % (ref 12.3–15.4)
GLOBULIN SER CALC-MCNC: 2.1 G/DL (ref 1.5–4.5)
GLUCOSE SERPL-MCNC: 201 MG/DL (ref 65–99)
HCT VFR BLD AUTO: 46 % (ref 37.5–51)
HGB BLD-MCNC: 16 G/DL (ref 13–17.7)
IMM GRANULOCYTES # BLD: 0 X10E3/UL (ref 0–0.1)
IMM GRANULOCYTES NFR BLD: 0 %
LYMPHOCYTES # BLD AUTO: 1.6 X10E3/UL (ref 0.7–3.1)
LYMPHOCYTES NFR BLD AUTO: 32 %
MCH RBC QN AUTO: 28.2 PG (ref 26.6–33)
MCHC RBC AUTO-ENTMCNC: 34.8 G/DL (ref 31.5–35.7)
MCV RBC AUTO: 81 FL (ref 79–97)
MONOCYTES # BLD AUTO: 0.3 X10E3/UL (ref 0.1–0.9)
MONOCYTES NFR BLD AUTO: 5 %
NEUTROPHILS # BLD AUTO: 3.1 X10E3/UL (ref 1.4–7)
NEUTROPHILS NFR BLD AUTO: 62 %
PLATELET # BLD AUTO: 164 X10E3/UL (ref 150–379)
POTASSIUM SERPL-SCNC: 4.1 MMOL/L (ref 3.5–5.2)
PROT SERPL-MCNC: 7.1 G/DL (ref 6–8.5)
RBC # BLD AUTO: 5.67 X10E6/UL (ref 4.14–5.8)
SODIUM SERPL-SCNC: 139 MMOL/L (ref 134–144)
TSH SERPL DL<=0.005 MIU/L-ACNC: 3.09 UIU/ML (ref 0.45–4.5)
VIT B12 SERPL-MCNC: 275 PG/ML (ref 232–1245)
WBC # BLD AUTO: 5 X10E3/UL (ref 3.4–10.8)

## 2018-08-12 NOTE — PROGRESS NOTES
Subjective:      Delaney De La Cruz is a 29 y.o. male who presents today for   Chief Complaint   Patient presents with    Diabetes     Follow up   type 2 diabetes: patient in today for follow up on diabetes. He has been very careful with his diet and exercises regularly  He has been taking increased dose of metformin  ER 2  tabs po daily   A1c has gone up today from 7.6 to 9.7    Patient states he feels very fatigued    Patient had some episodes of intermittent left sided chest pain. No associated symptoms. No pain on exertion    Low HDL-patient says he has been exercising      There are no active problems to display for this patient. Current Outpatient Prescriptions   Medication Sig Dispense Refill    sAXagliptin (ONGLYZA) 5 mg tab tablet Take 1 Tab by mouth daily. 90 Tab 1    metFORMIN ER (GLUCOPHAGE XR) 500 mg tablet Take 2 Tabs by mouth daily (with dinner). 61 Tab 3     No Known Allergies  Past Medical History:   Diagnosis Date    Diabetes ([facility]      Past Surgical History:   Procedure Laterality Date    HX TONSILLECTOMY       Family History   Problem Relation Age of Onset    Diabetes Mother     Diabetes Father      Social History   Substance Use Topics    Smoking status: Never Smoker    Smokeless tobacco: Never Used    Alcohol use No        Review of Systems    A comprehensive review of systems was negative except for that written in the HPI. Objective:     Visit Vitals    /82 (BP 1 Location: Right arm, BP Patient Position: Sitting)    Pulse 71    Temp 98.5 °F (36.9 °C) (Oral)    Resp 18    Ht 5' 9\" (1.753 m)    Wt 171 lb (77.6 kg)    SpO2 96%    BMI 25.25 kg/m2     General:  Alert, cooperative, no distress, appears stated age. Head:  Normocephalic, without obvious abnormality, atraumatic. Eyes:  Conjunctivae/corneas clear. PERRL, EOMs intact. Fundi benign. Ears:  Normal TMs and external ear canals both ears. Nose: Nares normal. Septum midline.  Mucosa normal. No drainage or sinus tenderness. Throat: Lips, mucosa, and tongue normal. Teeth and gums normal.   Neck: Supple, symmetrical, trachea midline, no adenopathy, thyroid: no enlargement/tenderness/nodules, no carotid bruit and no JVD. Back:   Symmetric, no curvature. ROM normal. No CVA tenderness. Lungs:   Clear to auscultation bilaterally. Chest wall:  No tenderness or deformity. Heart:  Regular rate and rhythm, S1, S2 normal, no murmur, click, rub or gallop. Abdomen:   Soft, non-tender. Bowel sounds normal. No masses,  No organomegaly. Extremities: Extremities normal, atraumatic, no cyanosis or edema. Pulses: 2+ and symmetric all extremities. Skin: Skin color, texture, turgor normal. No rashes or lesions. Lymph nodes: Cervical, supraclavicular, and axillary nodes normal.   Neurologic: CNII-XII intact. Normal strength, sensation and reflexes throughout. Assessment/Plan:       ICD-10-CM ICD-9-CM    1. Fatigue, unspecified type R53.83 780.79 AMB POC EKG ROUTINE W/ 12 LEADS, INTER & REP      AMB POC HEMOGLOBIN E1E      METABOLIC PANEL, COMPREHENSIVE      CBC WITH AUTOMATED DIFF      TSH 3RD GENERATION      VITAMIN D, 25 HYDROXY      VITAMIN B12      REFERRAL TO CARDIOLOGY      REFERRAL TO ENDOCRINOLOGY      DISCONTINUED: SITagliptin (JANUVIA) 100 mg tablet   2.  Chest pain, unspecified type    Atypical R07.9 786.50 AMB POC EKG ROUTINE W/ 12 LEADS, INTER & REP      AMB POC HEMOGLOBIN W0D      METABOLIC PANEL, COMPREHENSIVE      CBC WITH AUTOMATED DIFF      TSH 3RD GENERATION      VITAMIN D, 25 HYDROXY      VITAMIN B12      REFERRAL TO CARDIOLOGY      REFERRAL TO ENDOCRINOLOGY      DISCONTINUED: SITagliptin (JANUVIA) 100 mg tablet                                                                4. Low HDL (under 40) E78.6 272.5 Continue to exercise  Dietary modifications                                                         5. Elevated blood pressure reading R03.0 796.2 monitor 6. Uncontrolled type 2 diabetes mellitus with complication, without long-term current use of insulin (HCC) E11.8 250.82 AMB POC EKG ROUTINE W/ 12 LEADS, INTER & REP    E11.65  AMB POC HEMOGLOBIN B8G      METABOLIC PANEL, COMPREHENSIVE      CBC WITH AUTOMATED DIFF      TSH 3RD GENERATION      VITAMIN D, 25 HYDROXY      VITAMIN B12      REFERRAL TO CARDIOLOGY      REFERRAL TO ENDOCRINOLOGY          Increase metformin XR to 1500 mg po daily  Add onglyza 5 mg po daily       Follow-up Disposition:  Return in about 2 weeks (around 8/21/2018) for follow up review results. Advised him to call back or return to office if symptoms worsen/change/persist.  Discussed expected course/resolution/complications of diagnosis in detail with patient. Medication risks/benefits/costs/interactions/alternatives discussed with patient. He was given an after visit summary which includes diagnoses, current medications, & vitals. He expressed understanding with the diagnosis and plan.

## 2018-08-13 NOTE — TELEPHONE ENCOUNTER
----- Message from Wendy Mcclellan MD sent at 8/12/2018  9:58 AM EDT -----  Sheyla Acevedo please let this patient know that I would like to increase his metformin XR to 1500 mg every day. He can take 3 tabs of his metformin XR.  He should take this in addition to the onglyza I prescribed

## 2018-08-13 NOTE — TELEPHONE ENCOUNTER
Called and spoke with pt explained per Dr Roman Rosales that she would like for him to take 3 metformin at [facility] time instead of 2 for a total of 1500mg instead of 1000. He was directed to continue taking th onglyza 1 daily. Pt was able to verbally repeat the directions and will follow up as needed.  Bartolo Macias, SUKHN

## 2018-08-21 ENCOUNTER — OFFICE VISIT (OUTPATIENT)
Dept: INTERNAL MEDICINE CLINIC | Facility: CLINIC | Age: 33
End: 2018-08-21

## 2018-08-21 VITALS
RESPIRATION RATE: 18 BRPM | TEMPERATURE: 98.2 F | HEART RATE: 76 BPM | DIASTOLIC BLOOD PRESSURE: 80 MMHG | SYSTOLIC BLOOD PRESSURE: 130 MMHG | WEIGHT: 170 LBS | HEIGHT: 69 IN | BODY MASS INDEX: 25.18 KG/M2

## 2018-08-21 DIAGNOSIS — E55.9 VITAMIN D DEFICIENCY: ICD-10-CM

## 2018-08-21 DIAGNOSIS — E53.8 VITAMIN B12 DEFICIENCY: ICD-10-CM

## 2018-08-21 RX ORDER — ERGOCALCIFEROL 1.25 MG/1
50000 CAPSULE ORAL
Qty: 8 CAP | Refills: 0 | Status: SHIPPED | OUTPATIENT
Start: 2018-08-21 | End: 2019-02-19

## 2018-08-21 NOTE — PROGRESS NOTES
Chief Complaint   Patient presents with    Follow-up     Review results     1. Have you been to the ER, urgent care clinic since your last visit? Hospitalized since your last visit? No    2. Have you seen or consulted any other health care providers outside of the [address] [place] [address] since your last visit? Include any pap smears or colon screening.  No

## 2018-08-21 NOTE — MR AVS SNAPSHOT
[address] [place] [address] 
[phone] Patient: Nellie Sosa MRN: [id number] [facility]:[birth year] Visit Information Date & Time Provider Department Dept. Phone Encounter #  
 8/21/2018 12:45 PM Dank Culver, [facility] Internal Medicine [phone] [id number] Follow-up Instructions Return in about 3 months (around 11/7/2018) for follow up diabetes check. Your Appointments 11/16/2018  8:50 AM  
New Patient with MD Brain [facility]) Appt Note: new pt ref by [facility] Dr Krysten Bales [phone], for type 2 diabetes, notes and labs are in [address] [address]  
[phone]  
  
   
 [address] [place] [address] Upcoming Health Maintenance Date Due  
 FOOT EXAM Q1 8/14/1997 EYE EXAM RETINAL OR DILATED Q1 8/14/1997 DTaP/Tdap/Td series (1 - Tdap) 8/14/2008 Influenza Age 5 to Adult 8/1/2018 HEMOGLOBIN A1C Q6M 2/7/2019 MICROALBUMIN Q1 5/11/2019 LIPID PANEL Q1 5/11/2019 Allergies as of 8/21/2018  Review Complete On: 8/21/2018 By: Stacy Hayes LPN No Known Allergies Current Immunizations  Reviewed on 5/10/2018 Name Date Pneumococcal Polysaccharide (PPSV-23) 5/10/2018 Not reviewed this visit You Were Diagnosed With   
  
 Codes Comments Uncontrolled type 2 diabetes mellitus without complication, without long-term current use of insulin ([facility]    -  Primary ICD-10-CM: E11.65 ICD-9-CM: 250.02 Vitamin B12 deficiency     ICD-10-CM: E53.8 ICD-9-CM: 266.2 Vitamin D deficiency     ICD-10-CM: E55.9 ICD-9-CM: 268.9 Vitals BP Pulse Temp Resp Height(growth percentile) Weight(growth percentile) 130/80 76 98.2 °F (36.8 °C) (Oral) 18 5' 9\" (1.753 m) 170 lb (77.1 kg) BMI Smoking Status 25.1 kg/m2 Never Smoker Vitals History BMI and BSA Data Body Mass Index Body Surface Area  
 25.1 kg/m 2 1.94 m 2 Preferred Pharmacy Pharmacy Name Phone [id number] Your Updated Medication List  
  
   
This list is accurate as of 8/21/18  1:34 PM.  Always use your most recent med list.  
  
  
  
  
 ergocalciferol 50,000 unit capsule Commonly known as:  ERGOCALCIFEROL Take 1 Cap by mouth every seven (7) days. metFORMIN  mg tablet Commonly known as:  GLUCOPHAGE XR Take 2 Tabs by mouth daily (with dinner). sAXagliptin 5 mg Tab tablet Commonly known as:  ONGLYZA Take 1 Tab by mouth daily. Prescriptions Sent to Pharmacy Refills  
 ergocalciferol (ERGOCALCIFEROL) 50,000 unit capsule 0 Sig: Take 1 Cap by mouth every seven (7) days. Class: Normal  
 Pharmacy: [facility]/pharmacy #[id number]- [place], [address] [place] [address] #: [phone] Route: Oral  
  
Follow-up Instructions Return in about 3 months (around 11/7/2018) for follow up diabetes check. Introducing [facility] & HEALTH SERVICES! Yoel Teague introduces [employer] patient portal. Now you can access parts of your medical record, email your doctor's office, and request medication refills online. 1. In your internet browser, go to [email or web address] 2. Click on the First Time User? Click Here link in the Sign In box. You will see the New Member Sign Up page. 3. Enter your Manifest Access Code exactly as it appears below. You will not need to use this code after youve completed the sign-up process. If you do not sign up before the expiration date, you must request a new code. · [employer]ifest Access Code: [email or web address] Expires: 11/19/2018  1:34 PM 
 
4.  Enter the last four digits of your Social Security Number (xxxx) and Date of Birth (mm/dd/yyyy) as indicated and click Submit. You will be taken to the next sign-up page. 5. Create a [employer] ID. This will be your [employer] login ID and cannot be changed, so think of one that is secure and easy to remember. 6. Create a [employer] password. You can change your password at any time. 7. Enter your Password Reset Question and Answer. This can be used at a later time if you forget your password. 8. Enter your e-mail address. You will receive e-mail notification when new information is available in [address] 9. Click Sign Up. You can now view and download portions of your medical record. 10. Click the Download Summary menu link to download a portable copy of your medical information. If you have questions, please visit the Frequently Asked Questions section of the [employer] website. Remember, [employer] is NOT to be used for urgent needs. For medical emergencies, dial 911. Now available from your iPhone and Android! Please provide this summary of care documentation to your next provider. Your primary care clinician is listed as Echo Kemp. If you have any questions after today's visit, please call [phone].

## 2018-08-26 LAB
INSULIN FREE SERPL-ACNC: 15 UU/ML
INSULIN SERPL-ACNC: 15 UU/ML

## 2018-09-07 RX ORDER — METFORMIN HYDROCHLORIDE 500 MG/1
1000 TABLET, EXTENDED RELEASE ORAL
Qty: 60 TAB | Refills: 3 | Status: SHIPPED | OUTPATIENT
Start: 2018-09-07 | End: 2018-11-16

## 2018-09-07 NOTE — TELEPHONE ENCOUNTER
From: Malia Rivas  To:  Wendy Mcclellan MD  Sent: 9/7/2018 12:12 PM EDT  Subject: Medication Renewal Request    Original authorizing provider: MD Malia Musa Silt would like a refill of the following medications:  metFORMIN ER (GLUCOPHAGE XR) 500 mg tablet Wendy Mcclellan MD]    Preferred pharmacy: [facility]/PH[facility]ACY #[id number] [facility], [address] [place] [address]    Comment:

## 2018-11-16 ENCOUNTER — OFFICE VISIT (OUTPATIENT)
Dept: ENDOCRINOLOGY | Age: 33
End: 2018-11-16

## 2018-11-16 VITALS
BODY MASS INDEX: 25.42 KG/M2 | WEIGHT: 171.6 LBS | SYSTOLIC BLOOD PRESSURE: 148 MMHG | HEIGHT: 69 IN | DIASTOLIC BLOOD PRESSURE: 85 MMHG | HEART RATE: 89 BPM

## 2018-11-16 DIAGNOSIS — R79.89 LOW TESTOSTERONE: ICD-10-CM

## 2018-11-16 DIAGNOSIS — E11.8 TYPE 2 DIABETES MELLITUS WITH COMPLICATION, UNSPECIFIED WHETHER LONG TERM INSULIN USE: Primary | ICD-10-CM

## 2018-11-16 LAB — HBA1C MFR BLD HPLC: 8.1 %

## 2018-11-16 RX ORDER — METFORMIN HYDROCHLORIDE 500 MG/1
2000 TABLET, EXTENDED RELEASE ORAL DAILY
Qty: 120 TAB | Refills: 6 | Status: SHIPPED | OUTPATIENT
Start: 2018-11-16 | End: 2019-05-23

## 2018-11-16 RX ORDER — CLOMIPHENE CITRATE 50 MG/1
50 TABLET ORAL DAILY
COMMUNITY
End: 2019-05-23

## 2018-11-16 NOTE — LETTER
11/17/2018 12:28 AM 
 
Patient:  Marjan Thomas YOB: 1985 Date of Visit: 11/16/2018 Dear Saulo Oglesby MD 
[address] [address] VIA In Basket 
 : Thank you for referring Mr. Marjan Thomas to me for evaluation/treatment. Below are the relevant portions of my assessment and plan of care. If you have questions, please do not hesitate to call me. I look forward to following Mr. Praful Chavez along with you.  
 
 
 
Sincerely, 
 
 
Nora Jorgensen MD

## 2018-11-16 NOTE — PROGRESS NOTES
Chief Complaint   Patient presents with    Diabetes     PCP and pharmacy veriied    Other     Foot exam due    Other     Patient filling out consent form. History of Present Illness: Yenifer Diaz is a 34 y.o. male presents for evaluation of diabetes. He was referred by Dr Magalene Meckel  he has had diabetes since 2017. Most recent A1c was 9.7  8.1 POC at end of the visit today. Diabetes related complications:  6901 had last eye exam. Was negative  No sx of neuropathy  microalbumin negative 2018    Current diabetes regimen:  Metformin  onglyza added in 2018. Glucoses:  Mornin-140. After lunch tends to be highest - up to 180. Evenin-140. Bedtime - 140-160    Diet:  Cut out added sugars in summer 2018. More whole wheat. A typical day is as follows:  - Breakfast: [place] ([facility]) - wheat, quinoa or rice with  Vegetables. Egg.   - Lunch:  Brown rice + vegetables, egg/chicken. - Dinner: wheat bread   - Beverages: water, coffee. - Snacks:  occ biscuit. Exercise : 4 times a week walks 3 miles. Started this summer. BMI 25: baseline weight was around 75 kg, then increased to 84 kg. He has had a decrease in libido and erectile function. This led to the lab reports baseline testosterone level was 200. He is following with urology and they are treating with clomiphene. He reports subsequent testosterone values have been in the mid 600s. Social;  He was born in [facility]. 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 [address]     2016    Low testosterone in male     UTI (urinary tract infection)      Past Surgical History:   Procedure Laterality Date    HX TONSILLECTOMY       Current Outpatient Medications   Medication Sig    clomiPHENE (CLOMID) 50 mg tablet Take 50 mg by mouth daily.  metFORMIN ER (GLUCOPHAGE XR) 500 mg tablet Take 2 Tabs by mouth daily (with dinner).     ergocalciferol (ERGOCALCIFEROL) 50,000 unit capsule Take 1 Cap by mouth every seven (7) days.  sAXagliptin (ONGLYZA) 5 mg tab tablet Take 1 Tab by mouth daily. No current facility-administered medications for this visit.       No Known Allergies  Family History   Problem Relation Age of Onset    Diabetes Mother     Diabetes Father      Social History     Socioeconomic History    Marital status:      Spouse name: Not on file    Number of children: Not on file    Years of education: Not on file    Highest education level: Not on file   Social Needs    Financial resource strain: Not on file    Food insecurity - worry: Not on file    Food insecurity - inability: Not on file   [employer] needs - medical: Not on file   [employer] needs - non-medical: Not on file   Occupational History    Not on file   Tobacco Use    Smoking status: Never Smoker    Smokeless tobacco: Never Used   Substance and Sexual Activity    Alcohol use: No    Drug use: No    Sexual activity: Yes   Other Topics Concern    Not on file   Social History Narrative    Not on file     Review of Systems:  - Constitutional Symptoms: no fevers, chills, no significant weight loss  - Eyes: no blurry vision or double vision  - Cardiovascular: no chest pain or palpitations  - Respiratory: no cough or shortness of breath  - Gastrointestinal: no dysphagia or abdominal pain  - Musculoskeletal: no joint pains or weakness  - Integumentary: no rashes  - Neurological: no numbness, tingling, or headaches  - Psychiatric: no depression or anxiety  - Endocrine: See HPI    Physical Examination:  Visit Vitals  /85   Pulse 89   Ht 5' 9\" (1.753 m)   Wt 171 lb 9.6 oz (77.8 kg)   BMI 25.34 kg/m²   -   - General: pleasant, no distress,   - HEENT:No scleral/conjunctival injection, EOMI,MMM  - Neck: supple, no thyromegaly, masses, lymph nodes,  no supraclavicular or dorsocervical fat pads  - Cardiovascular: regular, normal rate  - Respiratory: normal effort  - Integumentary: no edema  - Neurological: alert and oriented  - Psychiatric: normal mood and affect    Data Reviewed:   Component      Latest Ref Rng & Units 11/16/2018 8/7/2018 5/11/2018 5/11/2018          10:18 AM 12:40 PM  8:43 AM  8:43 AM   Cholesterol, total      100 - 199 mg/dL    133   Triglyceride      0 - 149 mg/dL    84   HDL Cholesterol      >39 mg/dL    37 (L)   VLDL, calculated      5 - 40 mg/dL    17   LDL, calculated      0 - 99 mg/dL    79   Creatinine, urine      Not Estab. mg/dL   68.7    Microalbumin, urine      Not Estab. ug/mL   5.0    Microalbumin/Creat. Ratio      0.0 - 30.0 mg/g creat   7.3    Hemoglobin A1c (POC)      % 8.1 9.7       Assessment/Plan:   1. Type 2 diabetes mellitus with complication, unspecified whether long term insulin use (HCC)   Reviewed pathology of type II diabetes, including insulin resistance and progressive loss of beta cell function and insulin production with time. Explained the role of weight loss and limiting carbohydrate intake in affecting insulin needs. Reviewed basal and prandial insulin needs. Reviewed handout and gave basic directions on carbohydrate content of foods. Recommended limiting carbohydrate intake to < 45 g with meals. Encouraged exercise - 30 min 5 x weekly. He is currently tolerating a total of 1000 mg metformin daily. Recommend he see if he can increase his gradually to 1500 mg, and then to 2000 mg daily. Reviewed with him how medications from the GLP-1 class would be substantially more effective for glucose lowering and for weight loss. Additionally, these medications may have a cardiovascular benefit, whereas Onglyza does not  We will see if Ozempic is covered. If this is covered he will take 0.25 mg weekly, then increase to 0.5 mg weekly. If Ozempic is not covered, recommend Trulicity or Victoza. Discussed these as possibilities with him. Reviewed most common side effects of nausea vomiting, diarrhea.        2. Low testosterone   Continue clomiphene per urology. Reviewed how chronic disease and excess weight are associated with low testosterone and weight loss may result in improved testosterone levels    Greater than 50% of 60 minute visit was spent counseling the patient about above. Patient Instructions   Diabetes. Watch carbohydrate intake with meals and aim to keep this less than 45 grams per meal.    A Mediterranean style diet with 55% or less of calories from carbohydrates has been shown to be very helpful for people with diabetes. This diet consists of vegetables, whole fruit, nuts, whole grains, beans, lentils, olive oil, fish, chicken, and less refined grains, red and processed meats. Exercise: work up to 30 minutes 5 days weekly of walking, or any other activity that gets your heart rate up. Make sure you are getting enough sleep - at least 7 hours/night. Work on efforts with weight loss. Medications  Continue metformin  mg. See if you can increase this to 3 or 4 tablets daily. I recommend taking 3 tablets per day for 1-2 weeks before trying 4 tablets/day. Metformin can cause loose stools/diarrhea. Taking after meals can help with tolerability as well as dividing dose (taking 2 in AM and 2 in PM). Add Ozempic - 0.25 mg weekly for 4 weeks ,then increase to 0.5 mg weekly  When you increase to 0.5 mg dose, stop Onglyza  Can be associated with nausea of varying degrees from very mild to more severe (vomiting, etc). Some people have stomach discomfort as well    Alternatives - Trulicity - once weekly OR Victoza -once daily. From the same class of medications. Monitor glucoses periodically at different times  Goals for blood glucose:  Fasting  (less than 150). Normal is < 100  Lunch, Dinner, Bedtime -  (less than 180). Normal is < 130. Follow-up Disposition:  Return in about 3 months (around 2/16/2019).

## 2018-11-16 NOTE — PATIENT INSTRUCTIONS
Diabetes. Watch carbohydrate intake with meals and aim to keep this less than 45 grams per meal.    A Mediterranean style diet with 55% or less of calories from carbohydrates has been shown to be very helpful for people with diabetes. This diet consists of vegetables, whole fruit, nuts, whole grains, beans, lentils, olive oil, fish, chicken, and less refined grains, red and processed meats. Exercise: work up to 30 minutes 5 days weekly of walking, or any other activity that gets your heart rate up. Make sure you are getting enough sleep - at least 7 hours/night. Work on efforts with weight loss. Medications  Continue metformin  mg. See if you can increase this to 3 or 4 tablets daily. I recommend taking 3 tablets per day for 1-2 weeks before trying 4 tablets/day. Metformin can cause loose stools/diarrhea. Taking after meals can help with tolerability as well as dividing dose (taking 2 in AM and 2 in PM). Add Ozempic - 0.25 mg weekly for 4 weeks ,then increase to 0.5 mg weekly  When you increase to 0.5 mg dose, stop Onglyza  Can be associated with nausea of varying degrees from very mild to more severe (vomiting, etc). Some people have stomach discomfort as well    Alternatives - Trulicity - once weekly OR Victoza -once daily. From the same class of medications. Monitor glucoses periodically at different times  Goals for blood glucose:  Fasting  (less than 150). Normal is < 100  Lunch, Dinner, Bedtime -  (less than 180). Normal is < 130.

## 2018-11-19 ENCOUNTER — TELEPHONE (OUTPATIENT)
Dept: ENDOCRINOLOGY | Age: 33
End: 2018-11-19

## 2018-11-19 NOTE — TELEPHONE ENCOUNTER
Pharmacy stated that Di Sheppard is not cover under pt's plan and her insurance is not requesting a PA, but is requesting that the medication is changed to their preferred, which are Trulicity, Victoza, and Bydureon.

## 2018-11-19 NOTE — TELEPHONE ENCOUNTER
Will change to Trulicity 1.5 mg weekly when he completes the Ozempic sample  He will have the Ozempic sample for a total of 6 weeks. I'll send him a [employer].

## 2019-01-01 NOTE — TELEPHONE ENCOUNTER
Called and left a message for a return phone call. Encounter Date: 2019       History     Chief Complaint   Patient presents with    abdominal injury     Lonnie is a healthy 2 mo. Old who presents with abdominal injury that occurred just prior to arrival. Mom reports that he was sitting in a rocker low to the ground, grandma was carrying food in the kitchen when she turned around and stepped on his abdomen. She did not put her entire weight down on him and she fell to the ground. The rocker collapsed and he was immediately crying. He was initially inconsolable per mom, but when she got him in the car to come the ED he stopped crying and is currently at his baseline. He had some redness on his abdomen that has resolved, and has no other injuries. No recent fever or illness. He was tolerating PO and making wet diapers, but has not had any of either since the incident occurred just PTA. He may have mild hip dysplasia and is being followed by ortho, but has otherwise been healthy with no issues.    The history is provided by the mother and a grandparent.     Review of patient's allergies indicates:  No Known Allergies  Past Medical History:   Diagnosis Date    GERD (gastroesophageal reflux disease)      History reviewed. No pertinent surgical history.  Family History   Problem Relation Age of Onset    Cancer Maternal Grandfather         Copied from mother's family history at birth    Diabetes Maternal Grandfather         Copied from mother's family history at birth    Rectal cancer Maternal Grandfather         Copied from mother's family history at birth    Heart defect Maternal Grandmother         Copied from mother's family history at birth    No Known Problems Sister         Copied from mother's family history at birth     Social History     Tobacco Use    Smoking status: Never Smoker    Smokeless tobacco: Never Used   Substance Use Topics    Alcohol use: Not on file    Drug use: Not on file     Review of Systems   Constitutional: Negative for fever.    HENT: Negative for congestion and rhinorrhea.    Eyes: Negative for discharge.   Respiratory: Negative for cough.    Gastrointestinal: Negative for diarrhea and vomiting.   Genitourinary: Negative for decreased urine volume.   Skin: Negative for rash.   Allergic/Immunologic: Negative for immunocompromised state.   Neurological: Negative for seizures.   Hematological: Does not bruise/bleed easily.   All other systems reviewed and are negative.      Physical Exam     Initial Vitals   BP Pulse Resp Temp SpO2   07/17/19 1632 07/17/19 1350 07/17/19 1350 07/17/19 1350 07/17/19 1350   (!) 108/51 150 50 97.9 °F (36.6 °C) (!) 99 %      MAP       --                Physical Exam    Constitutional: He appears well-developed and well-nourished. He is active. No distress.   HENT:   Head: Anterior fontanelle is flat. No cranial deformity or facial anomaly.   Mouth/Throat: Mucous membranes are moist. Oropharynx is clear. Pharynx is normal.   Eyes: EOM are normal. Pupils are equal, round, and reactive to light.   Neck: Normal range of motion. Neck supple.   Cardiovascular: Normal rate, regular rhythm, S1 normal and S2 normal.   No murmur heard.  Pulmonary/Chest: Effort normal and breath sounds normal. No nasal flaring. No respiratory distress. He exhibits no retraction.   Abdominal: Soft. Bowel sounds are normal. He exhibits no distension and no mass. There is no hepatosplenomegaly. There is no tenderness. No hernia.   Genitourinary:   Genitourinary Comments: Prominence of Sub Q tissue in suprapubic area. Normal external genitalia. Patent anus   Musculoskeletal: Normal range of motion. He exhibits no edema, tenderness, deformity or signs of injury.   Neurological: He is alert. He has normal strength. He exhibits normal muscle tone. GCS score is 15. GCS eye subscore is 4. GCS verbal subscore is 5. GCS motor subscore is 6.   Skin: Skin is warm and dry. Capillary refill takes less than 2 seconds. Turgor is normal. No petechiae, no  purpura and no rash noted.         ED Course   Procedures  Labs Reviewed   COMPREHENSIVE METABOLIC PANEL - Abnormal; Notable for the following components:       Result Value    CO2 21 (*)     Creatinine 0.4 (*)     Calcium 10.9 (*)     AST 46 (*)     All other components within normal limits   URINALYSIS, REFLEX TO URINE CULTURE - Abnormal; Notable for the following components:    Specific Gravity, UA 1.000 (*)     Occult Blood UA 3+ (*)     All other components within normal limits    Narrative:     Preferred Collection Type->Urine, Clean Catch  WHITE CAP TUBE   URINALYSIS MICROSCOPIC - Abnormal; Notable for the following components:    WBC, UA 10 (*)     All other components within normal limits    Narrative:     Preferred Collection Type->Urine, Clean Catch  WHITE CAP TUBE   CBC W/ AUTO DIFFERENTIAL - Abnormal; Notable for the following components:    Platelets 481 (*)     Gran% 13.0 (*)     Platelet Estimate Increased (*)     All other components within normal limits   LIPASE   CK   CK    Narrative:     add on cpk [id number] per hussein eldridge md  2019  15:22           Imaging Results          US Abdomen Complete (Final result)  Result time 07/17/19 16:12:11    Final result by Fabrizio Ferguson MD (07/17/19 16:12:11)                 Impression:      No significant sonographic abnormalities identified.    Electronically signed by resident: Jaswinder Robles  Date:    2019  Time:    16:00    Electronically signed by: Fabrizio Ferguson MD  Date:    2019  Time:    16:12             Narrative:    EXAMINATION:  US ABDOMEN COMPLETE    CLINICAL HISTORY:  Unspecified injury of abdomen, initial encounter    TECHNIQUE:  Complete abdominal ultrasound was performed.    COMPARISON:  Ultrasound abdomen limited 2019    FINDINGS:  The liver measures 6.9 cm and is unremarkable.  There is no intra or extrahepatic bile duct dilatation.  The common duct measures 1 mm.    The gallbladder is unremarkable with no  evidence of wall thickening, pericholecystic fluid, sonographic Jj's sign, or cholelithiasis.    The visualized portions of the pancreas, IVC, and abdominal aorta are unremarkable.  The right kidney measures 6.1 cm and is unremarkable.  The left kidney measures 5.6 cm and is unremarkable.  The spleen measures 5.3 x 2.4 cm and is unremarkable.    There is no free fluid within the visualized abdomen.                               X-Ray Chest Lateral Decubitus Left (Final result)  Result time 07/17/19 15:10:41    Final result by Carlitos Tyson MD (07/17/19 15:10:41)                 Impression:      See above      Electronically signed by: Carlitos Tyson MD  Date:    2019  Time:    15:10             Narrative:    EXAMINATION:  XR CHEST LATERAL DECUBITUS LEFT    CLINICAL HISTORY:  Unspecified injury of abdomen, initial encounter    COMPARISON:  None    FINDINGS:  Left lateral decubitus view of the chest show no definite air trapping on the left side.  The lungs are clear.  No pleural effusion.  Heart size normal.                                 Medical Decision Making:   Initial Assessment:   2 month old healthy M with minor abdominal injury just prior to arrival when he was stepped on with partial weight. Immediately cried, now consolable and back to baseline. Tolerating PO currently. Will obtain basic labs, XR to r/o free air, US to look for fluid collection. Consult peds gen surg for recs.  Differential Diagnosis:   Minor abdominal wall trauma  Abdominal contusion  Peritoneal injury  Kidney injury  Liver injury  Bowel injury  Independently Interpreted Test(s):   I have ordered and independently interpreted X-rays - see summary below.       <> Summary of X-Ray Reading(s): I have independently looked at the Xray and I agree with the interpretation of the radiologist.  Clinical Tests:   Lab Tests: Ordered and Reviewed  The following lab test(s) were unremarkable: CBC and CMP       <> Summary of Lab: UA with +3  blood but no red cells - raising concern for myoglobinemia.    But CPK normal.  Radiological Study: Ordered and Reviewed  ED Management:  Patient assessed.  Labs reassuring.  Patient well during observation period and tolerating po.       APC / Resident Notes:   Labs and imaging unremarkable, well appearing, taking PO, making wet diapers without issue. Okay for discharge home         Attending Attestation:   Physician Attestation Statement for Resident:  As the supervising MD   Physician Attestation Statement: I have personally seen and examined this patient.   I agree with the above history. -:   As the supervising MD I agree with the above PE.   -: Patient seemed to jump when right abdomen palpated initially, but then not reproduced   As the supervising MD I agree with the above treatment, course, plan, and disposition.  I have reviewed and agree with the residents interpretation of the following: lab data and x-rays.                       Clinical Impression:       ICD-10-CM ICD-9-CM   1. Abdominal trauma, initial encounter S39.91XA 959.12         Disposition:   Disposition: Discharged  Condition: Stable  Accidental Abdominal trauma, but eval reassuring.  No serious injury detected.                        Tio Echeverria MD  07/18/19 1524

## 2019-01-23 RX ORDER — ONDANSETRON 8 MG/1
8 TABLET, ORALLY DISINTEGRATING ORAL
Qty: 15 TAB | Refills: 0 | Status: SHIPPED | OUTPATIENT
Start: 2019-01-23 | End: 2019-02-19

## 2019-02-19 ENCOUNTER — OFFICE VISIT (OUTPATIENT)
Dept: ENDOCRINOLOGY | Age: 34
End: 2019-02-19

## 2019-02-19 VITALS
BODY MASS INDEX: 25.21 KG/M2 | HEIGHT: 69 IN | HEART RATE: 79 BPM | DIASTOLIC BLOOD PRESSURE: 77 MMHG | SYSTOLIC BLOOD PRESSURE: 136 MMHG | WEIGHT: 170.2 LBS

## 2019-02-19 DIAGNOSIS — E11.9 TYPE 2 DIABETES MELLITUS WITHOUT COMPLICATION, WITHOUT LONG-TERM CURRENT USE OF INSULIN (HCC): Primary | ICD-10-CM

## 2019-02-19 DIAGNOSIS — E11.8 DIABETIC COMPLICATION (HCC): ICD-10-CM

## 2019-02-19 LAB — HBA1C MFR BLD HPLC: 6.8 %

## 2019-02-19 NOTE — PATIENT INSTRUCTIONS
Diabetes. Continue dietary efforts, exercise and weight loss efforts. Medications  Continue metformin  mg - 2-4/day    If blood glucoses are high, could add a medication from the SGLT2 inhibitor class of medications, which work in kidneys to excrete urine. Al Pearson. Monitor glucoses periodically at different times  Goals for blood glucose:  Fasting  (less than 150). Normal is < 100  Lunch, Dinner, Bedtime -  (less than 180). Normal is < 130.

## 2019-02-19 NOTE — PROGRESS NOTES
History of Present Illness: Jabier Larry is a 34 y.o. male presents for follow-up of diabetes  Most recent A1c was 9.7 in early 2018, down to 8.1 in end of May and now A1c 6.8 today    Diabetes related complications:  3486 had last eye exam. Was negative  No sx of neuropathy  microalbumin negative 5/2018    Current diabetes regimen:  Metformin  onglyza added in August 2018. This was discontinued in fall 2018 after GLP-1 agonists were started  Did not tolerate Trulicity - had constipation and then vomiting and diarrhea    Glucoses:  Values improved to 140s and 150s, down from 170s-180s  130s-140s. Diet:  Cut out added sugars in summer 2018. More whole wheat. A typical day is as follows:  - Breakfast: [place] ([facility]) - wheat, quinoa or rice with  Vegetables. Egg.   - Lunch:  Brown rice + vegetables, egg/chicken. - Dinner: wheat bread   - Beverages: water, coffee. - Snacks:  occ biscuit. Exercise : playing tennis most days. Walking several     BMI 25: baseline weight was around 75 kg, then increased to 84 kg. Social;  He was born in [facility]. Doing [employer]    Past Medical History:   Diagnosis Date    Diabetes ([address]     2016    Low testosterone in male     UTI (urinary tract infection)      Current Outpatient Medications   Medication Sig    ONETOUCH ULTRA BLUE TEST STRIP strip TEST BLOOD SUGAR DAILY    clomiPHENE (CLOMID) 50 mg tablet Take 50 mg by mouth daily.  metFORMIN ER (GLUCOPHAGE XR) 500 mg tablet Take 4 Tabs by mouth daily. As tolerated. No current facility-administered medications for this visit.       No Known Allergies    Review of Systems:  - Eyes: no blurry vision or double vision  - Cardiovascular: no chest pain  - Respiratory: no shortness of breath  - Musculoskeletal: no myalgias  - Neurological: no numbness/tingling in extremities    Physical Examination:  Visit Vitals  /77 (BP 1 Location: Right arm, BP Patient Position: Sitting)   Pulse 79   Ht 5' 9\" (1.753 m)   Wt 170 lb 3.2 oz (77.2 kg)   BMI 25.13 kg/m²   -   - General: pleasant, no distress, normal gait   HEENT: hearing intact, EOMI, clear sclera without icterus  - Cardiovascular: regular, normal rate   - Respiratory: normal effort  - Integumentary: no edema  - Psychiatric: normal mood and affect    Data Reviewed:   No results found for: HBA1C, [id number], QUG2ANUP   Lab Results   Component Value Date/Time    Sodium 139 08/07/2018 11:26 AM    Potassium 4.1 08/07/2018 11:26 AM    Creatinine 0.68 08/07/2018 11:26 AM    Microalb/Creat ratio (ug/mg creat.) 7.3 05/11/2018 08:43 AM        Lab Results   Component Value Date/Time    Cholesterol, total 133 05/11/2018 08:43 AM    HDL Cholesterol 37 05/11/2018 08:43 AM    LDL, calculated 79 05/11/2018 08:43 AM    Triglyceride 84 05/11/2018 08:43 AM      Lab Results   Component Value Date/Time    TSH 3.090 08/07/2018 11:26 AM        Assessment/Plan:   1. Type 2 diabetes mellitus without complication, without long-term current use of insulin (HCC)   Currently reported glucoses are quite good on metformin alone. He would prefer not to retry a GLP-1 agonist.  Discussed SGL T2 inhibitors. Reviewed how these medications work. Discussed potential side effects. Reviewed potential positive effects on cardiovascular disease and to slow kidney function decline. Continue metformin alone for now. Continue dietary and exercise efforts. Encouraged weight loss efforts. We will see him back in about 3 months. 2. Diabetic complication ([address]    Greater than 50% of 30 minute visit was spent counseling the patient about medications and above. Patient Instructions   Diabetes. Continue dietary efforts, exercise and weight loss efforts. Medications  Continue metformin  mg - 2-4/day    If blood glucoses are high, could add a medication from the SGLT2 inhibitor class of medications, which work in kidneys to excrete urine. Al [place]. Monitor glucoses periodically at different times  Goals for blood glucose:  Fasting  (less than 150). Normal is < 100  Lunch, Dinner, Bedtime -  (less than 180). Normal is < 130. Follow-up Disposition:  Return in about 3 months (around 5/19/2019) for Diabetes Follow-up.

## 2019-05-23 ENCOUNTER — OFFICE VISIT (OUTPATIENT)
Dept: ENDOCRINOLOGY | Age: 34
End: 2019-05-23

## 2019-05-23 VITALS
SYSTOLIC BLOOD PRESSURE: 113 MMHG | HEIGHT: 69 IN | WEIGHT: 165.4 LBS | HEART RATE: 82 BPM | BODY MASS INDEX: 24.5 KG/M2 | DIASTOLIC BLOOD PRESSURE: 78 MMHG

## 2019-05-23 DIAGNOSIS — E11.9 TYPE 2 DIABETES MELLITUS WITHOUT COMPLICATION, WITHOUT LONG-TERM CURRENT USE OF INSULIN (HCC): Primary | ICD-10-CM

## 2019-05-23 DIAGNOSIS — E11.21 TYPE 2 DIABETES WITH NEPHROPATHY (HCC): ICD-10-CM

## 2019-05-23 DIAGNOSIS — R79.89 LOW TESTOSTERONE: ICD-10-CM

## 2019-05-23 LAB — HBA1C MFR BLD HPLC: 6.6 %

## 2019-05-23 RX ORDER — EMPAGLIFLOZIN, METFORMIN HYDROCHLORIDE 25; 1000 MG/1; MG/1
1 TABLET, EXTENDED RELEASE ORAL DAILY
Qty: 30 EACH | Refills: 10 | Status: SHIPPED | OUTPATIENT
Start: 2019-05-23 | End: 2019-12-02 | Stop reason: SDUPTHER

## 2019-05-23 NOTE — PROGRESS NOTES
History of Present Illness: Kitty Freitas is a 34 y.o. male presents for follow-up of diabetes. Most recent A1c was 9.7 in early 2018, down to 8.1 in end of May, 6.8 , now 6.6    Diabetes related complications:  5960 had last eye exam. Was negative  No sx of neuropathy  microalbumin negative 5/2018. Due for repeat    Current diabetes regimen:  Metformin  onglyza added in August 2018. This was discontinued in fall 2018 after GLP-1 agonists were started  Did not tolerate Trulicity - had constipation and then vomiting and diarrhea    Glucoses:  Monitors in evenings. Typically in 140 range. Diet:  Cut out added sugars in summer 2018. More whole wheat. A typical day is as follows:  - mostly vegetables, whole grains, whole fruit. Exercise : playing tennis 2-3 times  Daily. . Walking - 5 miles - 6-7 pm     BMI 25: baseline weight was around 75 kg, then increased to 84 kg. Now 75 kg. Wt down 5 lbs over last 3 months. Was taking clomiphene - stopped a few months back. Feels the same. Clomiphene was being prescribed     Social;  He was born in [facility]. Doing [employer]      Past Medical History:   Diagnosis Date    Diabetes ([address]     2016    Low testosterone in male     UTI (urinary tract infection)      Current Outpatient Medications   Medication Sig    ONETOUCH ULTRA BLUE TEST STRIP strip TEST BLOOD SUGAR DAILY    clomiPHENE (CLOMID) 50 mg tablet Take 50 mg by mouth daily.  metFORMIN ER (GLUCOPHAGE XR) 500 mg tablet Take 4 Tabs by mouth daily. As tolerated. No current facility-administered medications for this visit.       No Known Allergies    Review of Systems:  - Eyes: no blurry vision or double vision  - Cardiovascular: no chest pain  - Respiratory: no shortness of breath  - Musculoskeletal: no myalgias  - Neurological: no numbness/tingling in extremities    Physical Examination:  Visit Vitals  /78 (BP 1 Location: Left arm, BP Patient Position: Sitting)   Pulse 82   Ht 5' 9\" (1.753 m)   Wt 165 lb 6.4 oz (75 kg)   BMI 24.43 kg/m²   -   - General: pleasant, no distress, normal gait   HEENT: hearing intact, EOMI, clear sclera without icterus  - Cardiovascular: regular, normal rate   - Respiratory: normal effort  - Integumentary: no edema   Diabetic foot exam:         Right Foot:   Visual Exam: normal    Pulse DP: 2+ (normal)   Filament test: normal sensation      - Psychiatric: normal mood and affect    Data Reviewed:   No results found for: HBA1C, SZI3JAOJ, MZT5LAKY   Lab Results   Component Value Date/Time    Sodium 139 08/07/2018 11:26 AM    Potassium 4.1 08/07/2018 11:26 AM    Creatinine 0.68 08/07/2018 11:26 AM    Microalb/Creat ratio (ug/mg creat.) 7.3 05/11/2018 08:43 AM        Lab Results   Component Value Date/Time    Cholesterol, total 133 05/11/2018 08:43 AM    HDL Cholesterol 37 05/11/2018 08:43 AM    LDL, calculated 79 05/11/2018 08:43 AM    Triglyceride 84 05/11/2018 08:43 AM      Lab Results   Component Value Date/Time    TSH 3.090 08/07/2018 11:26 AM        Assessment/Plan:   1. Type 2 diabetes mellitus without complication, without long-term current use of insulin (HCC)   - control is improving.   - encouraged continued efforts with diet and exercise  - he would like to improve glucoses further. We discussed SGLT2 inhibitors last time. He is interested in combining with metformin and decreasing pill count  AFter some discussion, will change metformin to Synjardy XR 25/1000 - 1 tablet daily. Discussed how Louvenia Alonso works, possiblity of yeast infection, increased urination. Advised him to hold if he has vomiting or diarrhea  Discussed very low, but possible risk of Meng's Gangrene. Reviewed symptoms which would prompt evaluation. 2. Low testosterone   - reassess off clomiphene. Weight loss and exercise should help. Greater than 50% of 25 minute visit was spent counseling the patient about above. Patient Instructions   Diabetes.   Continue dietary efforts, exercise and weight loss efforts. Medications  Change metformin to metformin + Jardiance = Synjardy XR 25/1000     May increase urination some. Drink a little more water. Hold if you have vomiting or diarrhea and are becoming dehydrated  Let me know if you have symptoms of a yeast infection (itching and irritation)    Monitor glucoses periodically at different times  Goals for blood glucose:  Fasting  (less than 150). Normal is < 100  Lunch, Dinner, Bedtime -  (less than 180). Normal is < 130. History of low testosterone  Reassess off clomiphene        Follow-up and Dispositions    · Return in about 3 months (around 8/23/2019).            Copy sent to:

## 2019-05-23 NOTE — PATIENT INSTRUCTIONS
Diabetes. Continue dietary efforts, exercise and weight loss efforts. Medications Change metformin to metformin + Jardiance = Synjardy XR 25/1000 May increase urination some. Drink a little more water. Hold if you have vomiting or diarrhea and are becoming dehydrated Let me know if you have symptoms of a yeast infection (itching and irritation) Monitor glucoses periodically at different times Goals for blood glucose: 
Fasting  (less than 150). Normal is < 100 Lunch, Dinner, Bedtime -  (less than 180). Normal is < 130. History of low testosterone Reassess off clomiphene

## 2019-05-25 LAB
ALBUMIN SERPL-MCNC: 4.5 G/DL (ref 3.5–5.5)
ALBUMIN/CREAT UR: 3.6 MG/G CREAT (ref 0–30)
ALBUMIN/GLOB SERPL: 1.9 {RATIO} (ref 1.2–2.2)
ALP SERPL-CCNC: 85 IU/L (ref 39–117)
ALT SERPL-CCNC: 24 IU/L (ref 0–44)
AST SERPL-CCNC: 15 IU/L (ref 0–40)
BILIRUB SERPL-MCNC: 0.3 MG/DL (ref 0–1.2)
BUN SERPL-MCNC: 9 MG/DL (ref 6–20)
BUN/CREAT SERPL: 11 (ref 9–20)
CALCIUM SERPL-MCNC: 9.6 MG/DL (ref 8.7–10.2)
CHLORIDE SERPL-SCNC: 106 MMOL/L (ref 96–106)
CHOLEST SERPL-MCNC: 136 MG/DL (ref 100–199)
CO2 SERPL-SCNC: 25 MMOL/L (ref 20–29)
CREAT SERPL-MCNC: 0.81 MG/DL (ref 0.76–1.27)
CREAT UR-MCNC: 86.5 MG/DL
GLOBULIN SER CALC-MCNC: 2.4 G/DL (ref 1.5–4.5)
GLUCOSE SERPL-MCNC: 150 MG/DL (ref 65–99)
HDLC SERPL-MCNC: 40 MG/DL
LDLC SERPL CALC-MCNC: 78 MG/DL (ref 0–99)
MICROALBUMIN UR-MCNC: 3.1 UG/ML
POTASSIUM SERPL-SCNC: 4.4 MMOL/L (ref 3.5–5.2)
PROT SERPL-MCNC: 6.9 G/DL (ref 6–8.5)
SODIUM SERPL-SCNC: 144 MMOL/L (ref 134–144)
TESTOST FREE SERPL-MCNC: 9.8 PG/ML (ref 8.7–25.1)
TESTOST SERPL-MCNC: 325 NG/DL (ref 264–916)
TRIGL SERPL-MCNC: 88 MG/DL (ref 0–149)
VLDLC SERPL CALC-MCNC: 18 MG/DL (ref 5–40)

## 2019-07-12 RX ORDER — FLUCONAZOLE 150 MG/1
150 TABLET ORAL
Qty: 2 TAB | Refills: 2 | Status: SHIPPED | OUTPATIENT
Start: 2019-07-12 | End: 2019-07-16

## 2019-08-26 ENCOUNTER — OFFICE VISIT (OUTPATIENT)
Dept: ENDOCRINOLOGY | Age: 34
End: 2019-08-26

## 2019-08-26 VITALS — WEIGHT: 166.4 LBS | BODY MASS INDEX: 24.57 KG/M2

## 2019-08-26 DIAGNOSIS — E11.9 TYPE 2 DIABETES MELLITUS WITHOUT COMPLICATION, WITHOUT LONG-TERM CURRENT USE OF INSULIN (HCC): Primary | ICD-10-CM

## 2019-08-26 LAB — HBA1C MFR BLD HPLC: 6.4 %

## 2019-08-26 NOTE — PATIENT INSTRUCTIONS
Diabetes. Continue dietary efforts, exercise and weight loss efforts. Medications  Synjardy XR 25/1000     Monitor glucoses periodically at different times  Goals for blood glucose:  Fasting  (less than 150). Normal is < 100  Lunch, Dinner, Bedtime -  (less than 180). Normal is < 130.

## 2019-08-26 NOTE — PROGRESS NOTES
History of Present Illness: Lilly Peralta is a 30 y.o. male presents for follow-up of diabetes. Most recent A1c was 9.7 in early 2018, down to 8.1 in end of May, 6.8 ,  6.6 and now 6.4    Diabetes related complications:  0541 had last eye exam. Was negative. To have another eye exam in the next month. No sx of neuropathy  microalbumin negative 5/2019    Current diabetes regimen:  Synjardy 25/1000 . Replaced metformin. Improved values  Did not tolerate Trulicity - vomiting and constipation. Glucoses:  Notes values are higher following tennis-  Around 200.  120-130 prior tennis. Bedtime - 130. Fasting values 100-110s. Diet:  Oats for breakfast  Quinoa for lunch  Dinner- chicken and vegetables. Exercise : playing tennis several times a week. Walking other days. BMI 24.5: baseline weight was around 75 kg, then increased to 84 kg. Now 75 kg. Social;  He was born in [facility]. Doing [employer]      Past Medical History:   Diagnosis Date    Diabetes ([facility]     2016    Low testosterone in male     UTI (urinary tract infection)      Current Outpatient Medications   Medication Sig    SYNJARDY XR 25-1,000 mg TBph Take 1 Tab by mouth daily.  ONETOUCH ULTRA BLUE TEST STRIP strip TEST BLOOD SUGAR DAILY     No current facility-administered medications for this visit. No Known Allergies    Review of Systems:  - Eyes: no vision changes.   - Cardiovascular: no chest pain  - Respiratory: no shortness of breath  - Musculoskeletal: no myalgias  - Neurological: no numbness/tingling in extremities    Physical Examination:  Visit Vitals  Wt 166 lb 6.4 oz (75.5 kg)   BMI 24.57 kg/m²     - General: pleasant, no distress, normal gait   HEENT: hearing intact, EOMI, clear sclera without icterus  - Cardiovascular: regular, normal rate   - Respiratory: normal effort  - Integumentary: no edema  - Psychiatric: normal mood and affect    Data Reviewed:   No results found for: HBA1C, DDM1HAFU, [id number]   Lab Results   Component Value Date/Time    Sodium 144 05/23/2019 10:54 AM    Potassium 4.4 05/23/2019 10:54 AM    Creatinine 0.81 05/23/2019 10:54 AM    Microalb/Creat ratio (ug/mg creat.) 3.6 05/23/2019 10:54 AM        Lab Results   Component Value Date/Time    Cholesterol, total 136 05/23/2019 10:54 AM    HDL Cholesterol 40 05/23/2019 10:54 AM    LDL, calculated 78 05/23/2019 10:54 AM    Triglyceride 88 05/23/2019 10:54 AM      Lab Results   Component Value Date/Time    TSH 3.090 08/07/2018 11:26 AM        Assessment/Plan:   1. Type 2 diabetes mellitus without complication, without long-term current use of insulin (HCC)   - controlled. Continue Synjardy XR  - higher values following tennis likely due to competative nature or due to intensity  Encouraged continued efforts with diet, exercise and maintaining a healthy weight. - he wishes to check renal function after starting SGLT2     Patient Instructions   Diabetes. Continue dietary efforts, exercise and weight loss efforts. Medications  Synjardy XR 25/1000     Monitor glucoses periodically at different times  Goals for blood glucose:  Fasting  (less than 150). Normal is < 100  Lunch, Dinner, Bedtime -  (less than 180). Normal is < 130. Follow-up and Dispositions    · Return in about 3 months (around 11/26/2019).

## 2019-08-26 NOTE — PROGRESS NOTES
Lab Results   Component Value Date/Time    Hemoglobin A1c (POC) 6.6 05/23/2019 10:07 AM     Lab Results   Component Value Date/Time    Microalb/Creat ratio (ug/mg creat.) 3.6 05/23/2019 10:54 AM     Diabetic Foot and Eye Exam HM Status   Topic Date Due    Eye Exam  08/14/1997    Diabetic Foot Care  05/23/2020     Lab Results   Component Value Date/Time    Cholesterol, total 136 05/23/2019 10:54 AM    HDL Cholesterol 40 05/23/2019 10:54 AM    LDL, calculated 78 05/23/2019 10:54 AM    VLDL, calculated 18 05/23/2019 10:54 AM    Triglyceride 88 05/23/2019 10:54 AM

## 2019-08-27 LAB
BUN SERPL-MCNC: 10 MG/DL (ref 6–20)
BUN/CREAT SERPL: 12 (ref 9–20)
CALCIUM SERPL-MCNC: 9.3 MG/DL (ref 8.7–10.2)
CHLORIDE SERPL-SCNC: 110 MMOL/L (ref 96–106)
CO2 SERPL-SCNC: 20 MMOL/L (ref 20–29)
CREAT SERPL-MCNC: 0.81 MG/DL (ref 0.76–1.27)
GLUCOSE SERPL-MCNC: 137 MG/DL (ref 65–99)
POTASSIUM SERPL-SCNC: 4.6 MMOL/L (ref 3.5–5.2)
SODIUM SERPL-SCNC: 144 MMOL/L (ref 134–144)

## 2019-12-02 ENCOUNTER — OFFICE VISIT (OUTPATIENT)
Dept: ENDOCRINOLOGY | Age: 34
End: 2019-12-02

## 2019-12-02 VITALS
WEIGHT: 162.2 LBS | RESPIRATION RATE: 16 BRPM | SYSTOLIC BLOOD PRESSURE: 117 MMHG | HEART RATE: 79 BPM | BODY MASS INDEX: 24.02 KG/M2 | DIASTOLIC BLOOD PRESSURE: 82 MMHG | HEIGHT: 69 IN | OXYGEN SATURATION: 96 %

## 2019-12-02 DIAGNOSIS — E11.9 TYPE 2 DIABETES MELLITUS WITHOUT COMPLICATION, WITHOUT LONG-TERM CURRENT USE OF INSULIN (HCC): Primary | ICD-10-CM

## 2019-12-02 LAB — HBA1C MFR BLD HPLC: 6.6 %

## 2019-12-02 RX ORDER — EMPAGLIFLOZIN, METFORMIN HYDROCHLORIDE 25; 1000 MG/1; MG/1
1 TABLET, EXTENDED RELEASE ORAL DAILY
Qty: 30 EACH | Refills: 11 | Status: SHIPPED | OUTPATIENT
Start: 2019-12-02

## 2019-12-02 NOTE — PROGRESS NOTES
History of Present Illness: Magi Stephens is a 30 y.o. male presents for follow-up of diabetes. Most recent A1c was 9.7 in early 2018, down to 8.1 in 5/2018 and has subsequently been in mid 6s for last year. 6.6 with POC A1c today. Diabetes related complications: None  No retinopathy. No sx of neuropathy  microalbumin negative 5/2019    Current diabetes regimen:  Synjardy 25/1000 . Replaced metformin. Improved values  Did not tolerate Trulicity - vomiting and constipation. Glucoses:  Monitors weekly:   120-130 when he checks in evening or afternoon. Diet:  Having smaller portions. Exercise : due to weather changes, not playing as much tennis outdoors. Plans on joining a local gym to resume regular exercise. BMI 24: baseline weight was around 75 kg, then increased to 84 kg. Now 75 kg. Now 73.6 kg. 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  ONETOUCH ULTRA BLUE TEST STRIP strip TEST BLOOD SUGAR DAILY     No current facility-administered medications for this visit.       No Known Allergies    Review of Systems:  - Eyes: no blurry vision or double vision  - Cardiovascular: no chest pain  - Respiratory: no shortness of breath  - Musculoskeletal: no myalgias  - Neurological: no numbness/tingling in extremities    Physical Examination:  Visit Vitals  /82   Pulse 79   Resp 16   Ht 5' 9\" (1.753 m)   Wt 162 lb 3.2 oz (73.6 kg)   SpO2 96%   BMI 23.95 kg/m²   -   - General: pleasant, no distress, normal gait   HEENT: hearing intact, EOMI, clear sclera without icterus  - Cardiovascular: regular, normal rate   - Respiratory: normal effort  - Integumentary: no edema  - Psychiatric: normal mood and affect    Data Reviewed:   Component      Latest Ref Rng & Units 12/2/2019 8/26/2019 5/23/2019           9:02 AM 10:02 AM 10:07 AM   Hemoglobin A1c (POC)      % 6.6 6.4 6.6     No results found for: HBA1C, DAX3UKRC, [id number]   Lab Results   Component Value Date/Time    Sodium 144 08/26/2019 10:19 AM    Potassium 4.6 08/26/2019 10:19 AM    Creatinine 0.81 08/26/2019 10:19 AM    Microalb/Creat ratio (ug/mg creat.) 3.6 05/23/2019 10:54 AM        Lab Results   Component Value Date/Time    Cholesterol, total 136 05/23/2019 10:54 AM    HDL Cholesterol 40 05/23/2019 10:54 AM    LDL, calculated 78 05/23/2019 10:54 AM    Triglyceride 88 05/23/2019 10:54 AM      Lab Results   Component Value Date/Time    TSH 3.090 08/07/2018 11:26 AM        Assessment/Plan:   1. Type 2 diabetes mellitus without complication, without long-term current use of insulin ([address]   - controlled  - congratulated him on weight loss and improvements  - continue Synjardy XR     Greater than 50% of 15+  minute visit was spent counseling the patient about  Above. Discussed follow-up after I leave. Recommended he follow with Dr Sarah López and then seek evaluation from endocrinologist if not at goal.     Patient Instructions   Diabetes. Continue dietary efforts, exercise and maintaining healthy weight    Medications  Synjardy XR 25/1000     Monitor glucoses periodically at different times  Goals for blood glucose:  Fasting  (less than 150). Normal is < 100  Lunch, Dinner, Bedtime -  (less than 180). Normal is < 130. Endocrinology options in [facility] area: [facility]:   Kwaku Simmons and [facility] Diabetes and Endocrinology [facility] --   Address: [address] [place] [address], [address], Angy Cate [facility] [address]   Phone: [phone]     [facility] and Endocrinology - Kwaku Hylton and [facility].   Address: [place], [place]. [address]   Phone: [phone]     Non-[facility] Endocrinology groups:     [facility]   Two locations: [address]    Phone: [phone]   [email or web address]     [address]   [place], [facility]     AND    [address] [facility]   [place], [address] [facility]  Diabetes and Endocrinology   Two locations: [address]    Phone: [phone]   [email or web address]     [address] [place] [address], [address]     AND     [address] [facility]   Angely Manning

## 2019-12-02 NOTE — PATIENT INSTRUCTIONS
Diabetes. Continue dietary efforts, exercise and maintaining healthy weight    Medications  Synjardy XR 25/1000     Monitor glucoses periodically at different times  Goals for blood glucose:  Fasting  (less than 150). Normal is < 100  Lunch, Dinner, Bedtime -  (less than 180). Normal is < 130. Endocrinology options in [facility] area: [facility]:   Kwaku Gaston and BERNICE [facility] Diabetes and Endocrinology [facility] HERNAN[facility] --   Address: [address] [place], [place] [address]is [facility], [address], [facility] [address]   Phone: [phone]     [facility] Diabetes and Endocrinology - Kwaku Richardson and [facility]. Address: [place], [place]. [address]   Phone: [phone]     Non-[facility] Endocrinology groups:     [facility]   Two locations: [address]    Phone: [phone]   [email or web address]     [address]idel[address] Corcor[address]   [facility], [address] [facility]     AND    [address] [facility], [address] [place] [address] Diabetes and Endocrinology   Two locations: [address]    Phone: [phone]   [email or web address]     [address] [facility], [facility] [address]     AND     [address]

## 2021-01-23 ENCOUNTER — TELEPHONE (OUTPATIENT)
Dept: ENDOCRINOLOGY | Age: 36
End: 2021-01-23

## 2021-01-24 RX ORDER — EMPAGLIFLOZIN, METFORMIN HYDROCHLORIDE 25; 1000 MG/1; MG/1
1 TABLET, EXTENDED RELEASE ORAL DAILY
Qty: 30 EACH | Refills: 11 | OUTPATIENT
Start: 2021-01-24

## 2021-01-24 NOTE — TELEPHONE ENCOUNTER
Please send a fax to [facility] and have them stop sending refills for his synjardy XR. he is no longer under Dr. Francisco Camarena care since he left our practice on 1/19/20 and he will need to contact his PCP or new endocrinologist for further refills of this medication.

## 2021-01-27 RX ORDER — EMPAGLIFLOZIN, METFORMIN HYDROCHLORIDE 25; 1000 MG/1; MG/1
1 TABLET, EXTENDED RELEASE ORAL DAILY
Qty: 30 EACH | Refills: 11 | OUTPATIENT
Start: 2021-01-27

## 2021-05-18 ENCOUNTER — TRANSCRIBE ORDER (OUTPATIENT)
Dept: SCHEDULING | Age: 36
End: 2021-05-18

## 2021-05-20 ENCOUNTER — TRANSCRIBE ORDER (OUTPATIENT)
Dept: SCHEDULING | Age: 36
End: 2021-05-20

## 2021-05-20 DIAGNOSIS — N50.819 TESTICULAR PAIN: Primary | ICD-10-CM

## 2021-05-20 DIAGNOSIS — N53.19 OTHER EJACULATORY DYSFUNCTION: ICD-10-CM

## 2022-03-19 PROBLEM — E11.9 TYPE 2 DIABETES MELLITUS WITHOUT COMPLICATION, WITHOUT LONG-TERM CURRENT USE OF INSULIN (HCC): Status: ACTIVE | Noted: 2019-12-02

## 2023-05-21 RX ORDER — EMPAGLIFLOZIN, METFORMIN HYDROCHLORIDE 25; 1000 MG/1; MG/1
1 TABLET, EXTENDED RELEASE ORAL DAILY
COMMUNITY
Start: 2019-12-02